# Patient Record
Sex: FEMALE | Race: WHITE | Employment: FULL TIME | URBAN - METROPOLITAN AREA
[De-identification: names, ages, dates, MRNs, and addresses within clinical notes are randomized per-mention and may not be internally consistent; named-entity substitution may affect disease eponyms.]

---

## 2024-03-13 ENCOUNTER — HOSPITAL ENCOUNTER (OUTPATIENT)
Age: 34
Setting detail: SPECIMEN
Discharge: HOME | End: 2024-03-13

## 2024-03-13 ENCOUNTER — ROUTINE PRENATAL (OUTPATIENT)
Dept: OBGYN CLINIC | Age: 34
End: 2024-03-13

## 2024-03-13 VITALS
BODY MASS INDEX: 38.65 KG/M2 | HEIGHT: 65 IN | WEIGHT: 232 LBS | DIASTOLIC BLOOD PRESSURE: 74 MMHG | SYSTOLIC BLOOD PRESSURE: 122 MMHG

## 2024-03-13 DIAGNOSIS — Z3A.11 11 WEEKS GESTATION OF PREGNANCY: ICD-10-CM

## 2024-03-13 DIAGNOSIS — O09.91 HRP (HIGH RISK PREGNANCY), FIRST TRIMESTER: Primary | ICD-10-CM

## 2024-03-13 DIAGNOSIS — O99.211 OBESITY AFFECTING PREGNANCY IN FIRST TRIMESTER, UNSPECIFIED OBESITY TYPE: ICD-10-CM

## 2024-03-13 DIAGNOSIS — Z3A.01 7 WEEKS GESTATION OF PREGNANCY: ICD-10-CM

## 2024-03-13 LAB
ABO + RH BLD: NORMAL
BASOPHILS # BLD: 0.04 K/UL (ref 0–0.2)
BASOPHILS NFR BLD: 0 % (ref 0–2)
BLOOD GROUP ANTIBODIES SERPL: NEGATIVE
EOSINOPHIL # BLD: 0.08 K/UL (ref 0–0.44)
EOSINOPHILS RELATIVE PERCENT: 1 % (ref 1–4)
ERYTHROCYTE [DISTWIDTH] IN BLOOD BY AUTOMATED COUNT: 12.7 % (ref 11.8–14.4)
HBV SURFACE AG SERPL QL IA: NONREACTIVE
HCT VFR BLD AUTO: 40.9 % (ref 36.3–47.1)
HCV AB SERPL QL IA: NONREACTIVE
HGB BLD-MCNC: 13.1 G/DL (ref 11.9–15.1)
HIV 1+2 AB+HIV1 P24 AG SERPL QL IA: NONREACTIVE
IMM GRANULOCYTES # BLD AUTO: <0.03 K/UL (ref 0–0.3)
IMM GRANULOCYTES NFR BLD: 0 %
LYMPHOCYTES NFR BLD: 2.38 K/UL (ref 1.1–3.7)
LYMPHOCYTES RELATIVE PERCENT: 25 % (ref 24–43)
MCH RBC QN AUTO: 28.5 PG (ref 25.2–33.5)
MCHC RBC AUTO-ENTMCNC: 32 G/DL (ref 28.4–34.8)
MCV RBC AUTO: 88.9 FL (ref 82.6–102.9)
MONOCYTES NFR BLD: 0.48 K/UL (ref 0.1–1.2)
MONOCYTES NFR BLD: 5 % (ref 3–12)
NEUTROPHILS NFR BLD: 69 % (ref 36–65)
NEUTS SEG NFR BLD: 6.7 K/UL (ref 1.5–8.1)
NRBC BLD-RTO: 0 PER 100 WBC
PLATELET # BLD AUTO: 374 K/UL (ref 138–453)
PMV BLD AUTO: 10 FL (ref 8.1–13.5)
RBC # BLD AUTO: 4.6 M/UL (ref 3.95–5.11)
RUBV IGG SERPL QL IA: 127 IU/ML
T PALLIDUM AB SER QL IA: NONREACTIVE
WBC OTHER # BLD: 9.7 K/UL (ref 3.5–11.3)

## 2024-03-13 PROCEDURE — 0502F SUBSEQUENT PRENATAL CARE: CPT | Performed by: ADVANCED PRACTICE MIDWIFE

## 2024-03-13 RX ORDER — ASPIRIN 81 MG/1
81 TABLET ORAL DAILY
Qty: 90 TABLET | Refills: 1 | Status: SHIPPED | OUTPATIENT
Start: 2024-03-13

## 2024-03-13 NOTE — PROGRESS NOTES
SUBJECTIVE:    Jennifer is here for her return OB visit. denies concerns today.   She denies  feeling fetal movement.  She denies vaginal bleeding.  She denies vaginal discharge.  She denies leaking of fluid.  She denies uterine cramping.  She denies  nausea and/or vomiting.    OBJECTIVE:  Blood pressure 122/74, height 1.651 m (5' 5\"), weight 105.2 kg (232 lb), last menstrual period 10/10/2023, not currently breastfeeding.    Total weight gain: 2.268 kg (5 lb)        ASSESSMENT/PLAN:  IUP @ 11+6 weeks  S=D    High Risk Pregnancy  Due date is based on LMP and confirmed with 7w6d early dating ultrasound  Patient's prenatal labs are not completed.  Patient's blood type is unknown   Early glucola indicated: yes  Completed: No  Patient Accepted  genetic screening.   Accepted, cell free DNA testing, and carrier screeningand test(s) are  completing after appointment today  Anatomy scan unscheduled   Total weight gain in pregnancy reviewed: Yes      2. 11 weeks gestation of pregnancy  - Reviewed warning signs   - Encouraged to scheduled anatomy scan   - Panorama Prenatal Test  - Horizon 14 (Pan-Ethnic Standard)    3. Obesity affecting pregnancy in first trimester, unspecified obesity type  - doing glucola today   - aspirin 81 MG EC tablet; Take 1 tablet by mouth daily  Dispense: 90 tablet; Refill: 1        She was counseled regarding all of the above:    Return in about 4 weeks (around 4/10/2024) for Return OB.    The patient, Lauren Franke was seen for 25 minutes were spent on this encounter on the date of service by the provider.         Electronically Signed By: SOL Miles CNM

## 2024-03-13 NOTE — PROGRESS NOTES
Chaperone for Intimate Exam  Chaperone was offered as part of the rooming process. Patient declined and agrees to continue with exam without a chaperone.  Chaperone: n/a    Patient unable to void at the time of vitals

## 2024-03-15 LAB — VZV IGG SER QL IA: 2.58

## 2024-03-20 LAB
Lab: NEGATIVE
Lab: NORMAL
NTRA ALPHA-THALASSEMIA: NEGATIVE
NTRA BETA-HEMOGLOBINOPATHIES: NEGATIVE
NTRA CANAVAN DISEASE: NEGATIVE
NTRA CYSTIC FIBROSIS: NEGATIVE
NTRA DUCHENNE/BECKER MUSCULAR DYSTROPHY: NEGATIVE
NTRA FAMILIAL DYSAUTONOMIA: NEGATIVE
NTRA FRAGILE X SYNDROME: NEGATIVE
NTRA GALACTOSEMIA: NEGATIVE
NTRA GAUCHER DISEASE: NEGATIVE
NTRA MEDIUM CHAIN ACYL-COA DEHYDROGENASE DEFICIENCY: NEGATIVE
NTRA POLYCYSTIC KIDNEY DISEASE, AUTOSOMAL RECESSIVE: NEGATIVE
NTRA SMITH-LEMLI-OPITZ SYNDROME: NEGATIVE
NTRA SPINAL MUSCULAR ATROPHY: NEGATIVE
NTRA TAY-SACHS DISEASE: NEGATIVE

## 2024-04-09 ENCOUNTER — HOSPITAL ENCOUNTER (OUTPATIENT)
Age: 34
Setting detail: SPECIMEN
Discharge: HOME | End: 2024-04-09

## 2024-04-09 DIAGNOSIS — O99.810 ABNORMAL GLUCOSE AFFECTING PREGNANCY: Primary | ICD-10-CM

## 2024-04-09 DIAGNOSIS — Z3A.01 7 WEEKS GESTATION OF PREGNANCY: ICD-10-CM

## 2024-04-09 LAB
GLUCOSE 1H P 50 G GLC PO SERPL-MCNC: 139 MG/DL (ref 70–135)
GLUCOSE ADMINISTRATION: ABNORMAL

## 2024-04-09 ASSESSMENT — ANXIETY QUESTIONNAIRES
7. FEELING AFRAID AS IF SOMETHING AWFUL MIGHT HAPPEN: NOT AT ALL
3. WORRYING TOO MUCH ABOUT DIFFERENT THINGS: NOT AT ALL
2. NOT BEING ABLE TO STOP OR CONTROL WORRYING: NOT AT ALL
4. TROUBLE RELAXING: NOT AT ALL
6. BECOMING EASILY ANNOYED OR IRRITABLE: NOT AT ALL
GAD7 TOTAL SCORE: 0
7. FEELING AFRAID AS IF SOMETHING AWFUL MIGHT HAPPEN: NOT AT ALL
IF YOU CHECKED OFF ANY PROBLEMS ON THIS QUESTIONNAIRE, HOW DIFFICULT HAVE THESE PROBLEMS MADE IT FOR YOU TO DO YOUR WORK, TAKE CARE OF THINGS AT HOME, OR GET ALONG WITH OTHER PEOPLE: NOT DIFFICULT AT ALL
4. TROUBLE RELAXING: NOT AT ALL
IF YOU CHECKED OFF ANY PROBLEMS ON THIS QUESTIONNAIRE, HOW DIFFICULT HAVE THESE PROBLEMS MADE IT FOR YOU TO DO YOUR WORK, TAKE CARE OF THINGS AT HOME, OR GET ALONG WITH OTHER PEOPLE: NOT DIFFICULT AT ALL
1. FEELING NERVOUS, ANXIOUS, OR ON EDGE: NOT AT ALL
5. BEING SO RESTLESS THAT IT IS HARD TO SIT STILL: NOT AT ALL
1. FEELING NERVOUS, ANXIOUS, OR ON EDGE: NOT AT ALL
3. WORRYING TOO MUCH ABOUT DIFFERENT THINGS: NOT AT ALL
6. BECOMING EASILY ANNOYED OR IRRITABLE: NOT AT ALL
5. BEING SO RESTLESS THAT IT IS HARD TO SIT STILL: NOT AT ALL
2. NOT BEING ABLE TO STOP OR CONTROL WORRYING: NOT AT ALL

## 2024-04-09 ASSESSMENT — EDINBURGH POSTNATAL DEPRESSION SCALE (EPDS)
THINGS HAVE BEEN GETTING ON TOP OF ME: NO, I HAVE BEEN COPING AS WELL AS EVER
I HAVE FELT SCARED OR PANICKY FOR NO GOOD REASON: NO, NOT AT ALL
I HAVE BLAMED MYSELF UNNECESSARILY WHEN THINGS WENT WRONG: NOT VERY OFTEN
I HAVE BEEN SO UNHAPPY THAT I HAVE HAD DIFFICULTY SLEEPING: NOT AT ALL
I HAVE BEEN ANXIOUS OR WORRIED FOR NO GOOD REASON: NO, NOT AT ALL
I HAVE LOOKED FORWARD WITH ENJOYMENT TO THINGS: AS MUCH AS I EVER DID
THE THOUGHT OF HARMING MYSELF HAS OCCURRED TO ME: NEVER
I HAVE BEEN ABLE TO LAUGH AND SEE THE FUNNY SIDE OF THINGS: AS MUCH AS I ALWAYS COULD
I HAVE BEEN SO UNHAPPY THAT I HAVE BEEN CRYING: NO, NEVER
I HAVE FELT SAD OR MISERABLE: NO, NOT AT ALL

## 2024-04-09 NOTE — RESULT ENCOUNTER NOTE
GDM results reviewed : failed early 1 hour screen 139, recommend fasting 3 hour. Order placed.    [employer] message sent to patient: Yes

## 2024-04-10 ENCOUNTER — ROUTINE PRENATAL (OUTPATIENT)
Dept: OBGYN CLINIC | Age: 34
End: 2024-04-10

## 2024-04-10 VITALS — BODY MASS INDEX: 38.61 KG/M2 | DIASTOLIC BLOOD PRESSURE: 62 MMHG | SYSTOLIC BLOOD PRESSURE: 118 MMHG | WEIGHT: 232 LBS

## 2024-04-10 DIAGNOSIS — O09.93 HIGH-RISK PREGNANCY IN THIRD TRIMESTER: ICD-10-CM

## 2024-04-10 DIAGNOSIS — O99.810 ABNORMAL GLUCOSE AFFECTING PREGNANCY: ICD-10-CM

## 2024-04-10 DIAGNOSIS — Z3A.15 15 WEEKS GESTATION OF PREGNANCY: Primary | ICD-10-CM

## 2024-04-10 PROCEDURE — 99024 POSTOP FOLLOW-UP VISIT: CPT | Performed by: STUDENT IN AN ORGANIZED HEALTH CARE EDUCATION/TRAINING PROGRAM

## 2024-04-10 RX ORDER — GLUCOSAMINE HCL/CHONDROITIN SU 500-400 MG
CAPSULE ORAL
Qty: 100 STRIP | Refills: 3 | Status: SHIPPED | OUTPATIENT
Start: 2024-04-10 | End: 2024-04-10

## 2024-04-10 RX ORDER — LANCETS 30 GAUGE
1 EACH MISCELLANEOUS 4 TIMES DAILY
Qty: 100 EACH | Refills: 3 | Status: SHIPPED | OUTPATIENT
Start: 2024-04-10 | End: 2024-04-10

## 2024-04-10 RX ORDER — UBIQUINOL 100 MG
CAPSULE ORAL
Qty: 100 EACH | Refills: 3 | Status: SHIPPED | OUTPATIENT
Start: 2024-04-10 | End: 2024-04-10

## 2024-04-10 RX ORDER — BLOOD-GLUCOSE METER
EACH MISCELLANEOUS
Qty: 1 EACH | Refills: 0 | Status: SHIPPED | OUTPATIENT
Start: 2024-04-10 | End: 2024-04-10

## 2024-04-10 NOTE — PROGRESS NOTES
**    Carrier Screening:  [] Undecided  [x] Accepted 3/13/2024 SOL Miles CNM   [] Declined  [] Known negative CF/SMA/Fragile X  [x] Results negative Horizon 14    Baby aspirin screen:  [x]Positive (parity and BMI)  []Negative    Early 1 hour GTT:  [x]Yes (BMI) 139 fail. 3 hour ordered 4/9/2024 SOL Miles CNM    [] No    AFP:  []Ordered  []Completed    Anatomy scan:  [x]Referral Sent 2/14/2024 Lara Herring LPN   [x]Scheduled   []Completed  [] Follow up **    Fetal Echo:   [] Yes  [] No    High Risk Factors:  Obesity   - pregravid BMI 38  - early 1 hour FAILED  - baby asa   - Patient declines 3 hr GTT due to feeling very sick after the 1 hour. Discussed checking blood sugars and she is amenable. Supplies sent 4/10/2024 Prema Ibrahim DO patient to provide values in 1-2 weeks    []Placed on High Risk List    Fetal Surveillance:  [x] Yes 37 weeks d/t BMI  [] No    Covid Vaccine:DECLINED 2/14/2024 Lara Herring LPN   Flu Vaccine:  [x]Given 10/23 2/14/2024 Lara Herring LPN   [] Declined   Tdap:  []Given **  [] Declined **  Rhogam:  []Given   [x] Not indicated     The problem list reflects the active issues addressed during today's visit    Patient Active Problem List    Diagnosis Date Noted    Abnormal glucose affecting pregnancy 04/09/2024    Migraine without aura and without status migrainosus, not intractable 10/31/2018    Eczema 01/08/2016     Return in about 4 weeks (around 5/8/2024) for routine ob.    Prema Ibrahim DO  [facility]  [address]    [address]  [place] [facility] [place], [address]  4/10/2024, 2:22 PM

## 2024-04-16 ENCOUNTER — HOSPITAL ENCOUNTER (OUTPATIENT)
Age: 34
Setting detail: SPECIMEN
Discharge: HOME | End: 2024-04-16

## 2024-04-16 DIAGNOSIS — O99.810 ABNORMAL GLUCOSE AFFECTING PREGNANCY: ICD-10-CM

## 2024-04-16 LAB
AMOUNT GLUCOSE GIVEN: NORMAL G
GLUCOSE 2H P 100 G GLC PO SERPL-MCNC: 59 MG/DL
GLUCOSE 3H P 100 G GLC PO SERPL-MCNC: 131 MG/DL
GLUCOSE TOLERANCE TEST 2 HOUR: 122 MG/DL
GLUCOSE TOLERANCE TEST 3 HOUR: 100 MG/DL

## 2024-04-16 NOTE — RESULT ENCOUNTER NOTE
GDM results reviewed : passed early 3 hour (59/131/122/100) 0/4 elevations, recommend repeating between 24-28 weeks    [employer] message sent to patient: Yes

## 2024-05-02 ENCOUNTER — PATIENT MESSAGE (OUTPATIENT)
Dept: OBGYN CLINIC | Age: 34
End: 2024-05-02

## 2024-05-02 NOTE — TELEPHONE ENCOUNTER
From: Lauren Franke  To: Hillary Quinn APRN - CNM  Sent: 5/2/2024 2:27 PM EDT  Subject: Zofran    Hi! My family has come down with a stomach bug. I don't have it currently but if I do end up with the very unpleasant symptoms, I am wondering if it is safe to take zofran? I have a prescription for it from my migraines. I just don't want to wait until if I start getting sick to ask

## 2024-05-07 ENCOUNTER — ROUTINE PRENATAL (OUTPATIENT)
Dept: OBGYN CLINIC | Age: 34
End: 2024-05-07

## 2024-05-07 VITALS — WEIGHT: 232.6 LBS | SYSTOLIC BLOOD PRESSURE: 120 MMHG | BODY MASS INDEX: 38.71 KG/M2 | DIASTOLIC BLOOD PRESSURE: 78 MMHG

## 2024-05-07 DIAGNOSIS — O99.810 ABNORMAL GLUCOSE AFFECTING PREGNANCY: ICD-10-CM

## 2024-05-07 DIAGNOSIS — O09.93 HIGH-RISK PREGNANCY IN THIRD TRIMESTER: Primary | ICD-10-CM

## 2024-05-07 DIAGNOSIS — Z3A.19 19 WEEKS GESTATION OF PREGNANCY: ICD-10-CM

## 2024-05-07 PROCEDURE — 0502F SUBSEQUENT PRENATAL CARE: CPT | Performed by: NURSE PRACTITIONER

## 2024-05-07 NOTE — PROGRESS NOTES
Presents for OB visit  Gestation 19w5d  Estimated Date of Delivery: 24    Insurance: [employer]eth[employer]    IPV bag/mug given:2024   Genetic Information given/reviewed: 2024 SOL Miles CNM   1st trimester education packet given: 2024 SOL Miles CNM   2nd trimester education packet given: 4/10/2024 Prema Ibrahim DO   3rd trimester education packet given:      FOB Name: Mark-    **Dating off USN**    Last Pap Smear:  NILM (no hx abnormal over 30 good for 5 years)  [x] Urine collected for culture 2024 Lara Herring LPN    [x] Negative date    [] Positive date   [x] UDS collected 2024 Maureen Barr MA  [x] Gc/ct collected 2024 Maureen Barr MA  [x] Prenatal Labs completed Prema Ibrahim DO     Genetic Screening:  [x]Accepted NIPS 3/13/2024 SOL Miles CNM   []Declined   [x]Completed  [x] Low risk female  [] Abn for     Carrier Screening:  [] Undecided  [x] Accepted 3/13/2024 SOL Miles CNM   [] Declined  [] Known negative CF/SMA/Fragile X  [x] Results negative Horizon 14    Baby aspirin screen:  [x]Positive (parity and BMI)  []Negative    Early 1 hour GTT:  [x]Yes (BMI) 139 fail. 3 hour ordered 2024 SOL Miles CNM    [] No    AFP:  []Ordered  []Completed    Anatomy scan:  [x]Referral Sent 2024 Lara Herring LPN   [x]Scheduled   []Completed  [] Follow up **    Fetal Echo:   [] Yes  [] No    High Risk Factors:  Obesity   - pregravid BMI 38  - early 1 hour FAILED  - baby asa   - Patient declines 3 hr GTT due to feeling very sick after the 1 hour. Discussed checking blood sugars and she is amenable. Supplies sent 4/10/2024 Prema Ibrahim DO patient to provide values in 1-2 weeks    []Placed on High Risk List    Fetal Surveillance:  [x] Yes 37 weeks d/t BMI  [] No    Covid Vaccine:DECLINED 2024 Lara Herring LPN   Flu Vaccine:  [x]Given 10/23 2024 Lara Herring LPN

## 2024-05-13 ENCOUNTER — ROUTINE PRENATAL (OUTPATIENT)
Dept: PERINATAL CARE | Age: 34
End: 2024-05-13
Payer: COMMERCIAL

## 2024-05-13 VITALS
DIASTOLIC BLOOD PRESSURE: 59 MMHG | SYSTOLIC BLOOD PRESSURE: 106 MMHG | HEART RATE: 79 BPM | WEIGHT: 234.57 LBS | TEMPERATURE: 98.1 F | HEIGHT: 66 IN | RESPIRATION RATE: 16 BRPM | BODY MASS INDEX: 37.7 KG/M2

## 2024-05-13 DIAGNOSIS — Z36.86 ENCOUNTER FOR SCREENING FOR RISK OF PRE-TERM LABOR: ICD-10-CM

## 2024-05-13 DIAGNOSIS — O99.212 OBESITY AFFECTING PREGNANCY IN SECOND TRIMESTER, UNSPECIFIED OBESITY TYPE: ICD-10-CM

## 2024-05-13 DIAGNOSIS — O09.512 PRIMIGRAVIDA OF ADVANCED MATERNAL AGE IN SECOND TRIMESTER: Primary | ICD-10-CM

## 2024-05-13 DIAGNOSIS — Z3A.20 20 WEEKS GESTATION OF PREGNANCY: ICD-10-CM

## 2024-05-13 DIAGNOSIS — O99.810 ABNORMAL MATERNAL GLUCOSE TOLERANCE, ANTEPARTUM: ICD-10-CM

## 2024-05-13 DIAGNOSIS — O44.22 PLACENTA MARGINALIS IN SECOND TRIMESTER: ICD-10-CM

## 2024-05-13 PROCEDURE — 76811 OB US DETAILED SNGL FETUS: CPT | Performed by: OBSTETRICS & GYNECOLOGY

## 2024-05-13 PROCEDURE — 76817 TRANSVAGINAL US OBSTETRIC: CPT | Performed by: OBSTETRICS & GYNECOLOGY

## 2024-05-13 PROCEDURE — 99999 PR OFFICE/OUTPT VISIT,PROCEDURE ONLY: CPT | Performed by: OBSTETRICS & GYNECOLOGY

## 2024-05-13 NOTE — PROGRESS NOTES
I would advise continuation of daily oral baby aspirin 81 mg based on guidelines by the USPSTF/ACOG (for preeclampsia prevention for pregnant women at \"high-risk\"  for preeclampsia).      Advise repeat 3-hour glucose tolerance test between 24-28 weeks gestation to evaluate for gestational diabetes.       Please refer to non-invasive prenatal testing/NIPT results (via [facility]).     Please refer to maternal carrier results from [place] carrier screening testing.     Options with respect to offering the patient invasive genetic prenatal testing and MSAFP screen could not be completed today, as the patient declines a Maternal-Fetal Medicine physician consultation today.     Patient declines a Maternal-Fetal Medicine complete physician consultation today regarding the fetal and/or maternal medical/obstetrical complications/co-morbidities of pregnancy.      Maternal-Fetal Medicine (MFM) attending physician will defer all management for these medical/obstetrical complications of pregnancy to the primary attending obstetrical physician/provider, as a result.  Therefore, only an ultrasound evaluation was completed today in the MFM office.      Please refer to Maternal-Fetal Medicine OBGYN resident progress note in [facility].

## 2024-05-13 NOTE — PROGRESS NOTES
Obstetric/Gynecology Maternal Fetal Medicine Resident Note      Patient declines formal consult with MFM attending physician for marginal cord insertion seen on ultrasound today as well as AMA at the time of delivery.     Rhonda Hills DO  OBGYSEGUNDO Resident, PGY1  [facility]  5/13/2024, 2:20 PM

## 2024-06-05 ENCOUNTER — ROUTINE PRENATAL (OUTPATIENT)
Dept: OBGYN CLINIC | Age: 34
End: 2024-06-05

## 2024-06-05 VITALS
BODY MASS INDEX: 38.09 KG/M2 | WEIGHT: 237 LBS | SYSTOLIC BLOOD PRESSURE: 102 MMHG | HEIGHT: 66 IN | DIASTOLIC BLOOD PRESSURE: 70 MMHG

## 2024-06-05 DIAGNOSIS — O43.199 MARGINAL INSERTION OF UMBILICAL CORD AFFECTING MANAGEMENT OF MOTHER: ICD-10-CM

## 2024-06-05 DIAGNOSIS — O09.92 HRP (HIGH RISK PREGNANCY), SECOND TRIMESTER: Primary | ICD-10-CM

## 2024-06-05 DIAGNOSIS — O26.899 PELVIC PAIN IN PREGNANCY: ICD-10-CM

## 2024-06-05 DIAGNOSIS — O99.810 ABNORMAL GLUCOSE AFFECTING PREGNANCY: ICD-10-CM

## 2024-06-05 DIAGNOSIS — R10.2 PELVIC PAIN IN PREGNANCY: ICD-10-CM

## 2024-06-05 DIAGNOSIS — Z3A.23 23 WEEKS GESTATION OF PREGNANCY: ICD-10-CM

## 2024-06-05 DIAGNOSIS — O99.212 OBESITY AFFECTING PREGNANCY IN SECOND TRIMESTER, UNSPECIFIED OBESITY TYPE: ICD-10-CM

## 2024-06-05 PROCEDURE — 0502F SUBSEQUENT PRENATAL CARE: CPT | Performed by: ADVANCED PRACTICE MIDWIFE

## 2024-06-05 NOTE — PROGRESS NOTES
SUBJECTIVE:    Jennifer is here for her return OB visit. admits to concerns today. On going pelvic pain  She reports  feeling fetal movement.  She denies vaginal bleeding.  She denies vaginal discharge.  She denies leaking of fluid.  She denies uterine cramping.  She denies  nausea and/or vomiting.    OBJECTIVE:  Blood pressure 102/70, height 1.676 m (5' 6\"), weight 107.5 kg (237 lb), last menstrual period 10/10/2023, not currently breastfeeding.    Total weight gain: 4.536 kg (10 lb)      ASSESSMENT/PLAN:  IUP @ 23+6 weeks  S=D    High Risk Pregnancy  Due date is based on LMP and confirmed with 7w6d early dating ultrasound  Patient's prenatal labs are completed.  Patient's blood type A positive and rhogam is not indicated in the pregnancy.   Early glucola indicated: yes  Completed: Yes  Results: 139 Fail  Plan: Fasting 3 hour completed 24 59/131/122/100 0/4 no elevations  Patient Accepted  genetic screening.   NIPT could not be completed  Repeat NIPT low risk  Negative carrier screening  Anatomy scan scheduled 24 completed. Placenta anterior,normal cord insertion: No and MARGINAL CORD INSERTION cervical length 4.38 cm, NO low lying placenta, no placenta previa .   F/up USN 6/10/24  Second trimester 24-28 week labs:   [x] Ordered 2024 SOL Miles - ADEOLA   Total weight gain in pregnancy reviewed: Yes  Fetal movement was reviewed.  Reviewed signs and symptoms of  labor: Yes  Reviewed signs and symptoms of preeclampsia: Yes  Reviewed signs and symptoms of alessio hick contractions:  Yes      2. 23 weeks gestation of pregnancy  - Reviewed third trimester labs. Agreeable to repeating fasting 3 hour   - Reviewed tdap recommendations   - CBC; Future  - Culture, Urine; Future    3. Marginal insertion of umbilical cord affecting management of mother  - Reviewed findings on MFM    4. Abnormal glucose affecting pregnancy  - Glucose Tolerance, 3 Hours; Future    5. Obesity affecting pregnancy

## 2024-06-10 ENCOUNTER — ROUTINE PRENATAL (OUTPATIENT)
Dept: PERINATAL CARE | Age: 34
End: 2024-06-10
Payer: COMMERCIAL

## 2024-06-10 VITALS
DIASTOLIC BLOOD PRESSURE: 71 MMHG | HEART RATE: 81 BPM | HEIGHT: 66 IN | TEMPERATURE: 97.3 F | RESPIRATION RATE: 16 BRPM | WEIGHT: 242.29 LBS | BODY MASS INDEX: 38.94 KG/M2 | SYSTOLIC BLOOD PRESSURE: 116 MMHG

## 2024-06-10 DIAGNOSIS — Z3A.24 24 WEEKS GESTATION OF PREGNANCY: ICD-10-CM

## 2024-06-10 DIAGNOSIS — O09.512 PRIMIGRAVIDA OF ADVANCED MATERNAL AGE IN SECOND TRIMESTER: ICD-10-CM

## 2024-06-10 DIAGNOSIS — O99.810 ABNORMAL MATERNAL GLUCOSE TOLERANCE, ANTEPARTUM: ICD-10-CM

## 2024-06-10 DIAGNOSIS — O99.212 OBESITY AFFECTING PREGNANCY IN SECOND TRIMESTER, UNSPECIFIED OBESITY TYPE: ICD-10-CM

## 2024-06-10 DIAGNOSIS — O44.22 PLACENTA MARGINALIS IN SECOND TRIMESTER: Primary | ICD-10-CM

## 2024-06-10 DIAGNOSIS — Z36.4 ULTRASOUND FOR ANTENATAL SCREENING FOR FETAL GROWTH RESTRICTION: ICD-10-CM

## 2024-06-10 PROCEDURE — 76816 OB US FOLLOW-UP PER FETUS: CPT | Performed by: OBSTETRICS & GYNECOLOGY

## 2024-06-10 PROCEDURE — 99999 PR OFFICE/OUTPT VISIT,PROCEDURE ONLY: CPT | Performed by: OBSTETRICS & GYNECOLOGY

## 2024-06-10 PROCEDURE — 76820 UMBILICAL ARTERY ECHO: CPT | Performed by: OBSTETRICS & GYNECOLOGY

## 2024-06-12 ENCOUNTER — HOSPITAL ENCOUNTER (OUTPATIENT)
Dept: PHYSICAL THERAPY | Facility: CLINIC | Age: 34
Setting detail: THERAPIES SERIES
Discharge: HOME | End: 2024-06-12
Payer: COMMERCIAL

## 2024-06-12 PROCEDURE — 97140 MANUAL THERAPY 1/> REGIONS: CPT

## 2024-06-12 PROCEDURE — 97530 THERAPEUTIC ACTIVITIES: CPT

## 2024-06-12 PROCEDURE — 97161 PT EVAL LOW COMPLEX 20 MIN: CPT

## 2024-06-12 NOTE — CONSULTS
mobility & transfers to reduce pressure  - Urinary Incontinence  - Ice  - Get To Know Your Pelvic Floor- Female  - Sacroiliac Joint Dysfunction      Evaluation Complexity:  History (Personal factors, comorbidities) [] 0 [] 1-2 [x] 3+   Exam (limitations, restrictions) [] 1-2 [] 3 [x] 4+   Clinical presentation (progression) [] Stable [x] Evolving  [] Unstable   Decision Making [x] Low [] Moderate [] High     [x] Low Complexity [] Moderate Complexity [] High Complexity      Treatment Charges: Mins Units   [x] Evaluation (low complex) 30 1   [] Modalities         [x] Ther Exercise 8 0   [x] Manual Therapy 10  1    [x] Ther Activities 10 1   [] Aquatics         [] Vasocompression         [] Other             TOTAL TREATMENT TIME: 58 min    Time in: 1p Time out: 2p      Electronically signed by: Josefina Stallworth PT, DPT     Physician Signature:________________________________Date:__________________  By signing above or cosigning this note, I have reviewed this plan of care and certify a need for medically necessary rehabilitation services.       *PLEASE SIGN ABOVE AND FAX BACK ALL PAGES*

## 2024-06-16 ENCOUNTER — PATIENT MESSAGE (OUTPATIENT)
Dept: OBGYN CLINIC | Age: 34
End: 2024-06-16

## 2024-06-17 NOTE — TELEPHONE ENCOUNTER
From: Lauren Franke  To: Hillary Quinn APRN - CNM  Sent: 6/16/2024 1:01 PM EDT  Subject: Foot/ankle swelling    I know that it's normal as pregnancy progresses but for the past 3 days I've been having swelling in my feet and ankles. I wasn't sure if it's too early for that or if it could be something I need to be worried about?

## 2024-06-18 ENCOUNTER — HOSPITAL ENCOUNTER (OUTPATIENT)
Dept: PHYSICAL THERAPY | Facility: CLINIC | Age: 34
Setting detail: THERAPIES SERIES
Discharge: HOME | End: 2024-06-18
Payer: COMMERCIAL

## 2024-06-18 PROCEDURE — 97140 MANUAL THERAPY 1/> REGIONS: CPT

## 2024-06-18 PROCEDURE — 97110 THERAPEUTIC EXERCISES: CPT

## 2024-06-18 NOTE — FLOWSHEET NOTE
[x] [facility]  Outpatient Rehabilitation &  Therapy  [address]  P: [phone]  F: [phone]     Physical Therapy Daily Treatment Note    Date:  2024  Patient Name:  Lauren Franke    :  1990  MRN: [id number]  Physician: Hillary Quinn                           Insurance: [facility] only 4 modalities per visit   Medical Diagnosis: O26.899, R10.2 (ICD-10-CM) - Pelvic pain in pregnancy    Rehab Codes: M54.9, M79.1, R26.2NEC, R29.3, N39.46, M62.81, M99.05, M62.08   Onset Date: 24                         Next 's appt.: 7/3/24  Visit# / total visits:      Cancels/No Shows: 0    Subjective:    Pain:  [x] Yes  [] No  Location: L sided SI joint  Pain Rating: (0-10 scale) 2-3/10  Pain altered Tx:  [x] No  [] Yes  Action:    Comments: Pt with increased LE swelling over the weekend, has since subsided. Pt has been monitoring her BP, which has remained normal. Pt with mild SI joint pain on the L. R sided pain resolved after last session.     Objective:  Modalities:   Kinesiotape: 4 long I's: 2 // to linea alba and 2 horizontal placed from ASIS to ASIS- reapplied   Precautions: Pregnant - Due 24; MFM for monitoring marginal cord & anterior placenta placement   Exercises: HEP Review - [employer] access code: [email or web address] - pt own stability ball, yoga block, play-ball, TRX for exercise at home  KT= Kinesiotape  PB= pelvic brace (DB-exhale+ TA contraction + kegel)  DB= diaphragmatic breathing  Exercise Reps/ Time Weight/ Level Comments   Nustep            Pelvic model & education    Analogies - IAP & use of pelvic brace to reduce pressure/coning  Diaphragm & pelvic floor relationship (visual aid) in relation to pressure management & function     Squatty potty for all voiding  log rolling    keep ribs over pelvis   lumbar roll - use towel  carseat adjustment      Serola Pelvic Belt     exhale with effort & movement     kinesiotape - waterproof 4-7days  -

## 2024-06-25 ENCOUNTER — HOSPITAL ENCOUNTER (OUTPATIENT)
Dept: PHYSICAL THERAPY | Facility: CLINIC | Age: 34
Setting detail: THERAPIES SERIES
Discharge: HOME | End: 2024-06-25
Payer: COMMERCIAL

## 2024-06-25 PROCEDURE — 97110 THERAPEUTIC EXERCISES: CPT

## 2024-06-25 PROCEDURE — 97140 MANUAL THERAPY 1/> REGIONS: CPT

## 2024-06-25 NOTE — FLOWSHEET NOTE
[x] [facility]  Outpatient Rehabilitation &  Therapy  [address]  P: [phone]  F: [phone]     Physical Therapy Daily Treatment Note    Date:  2024  Patient Name:  Lauren Franke    :  1990  MRN: [id number]  Physician: Hillary Quinn                           Insurance: [facility] -60 only 4 modalities per visit   Medical Diagnosis: O26.899, R10.2 (ICD-10-CM) - Pelvic pain in pregnancy    Rehab Codes: M54.9, M79.1, R26.2NEC, R29.3, N39.46, M62.81, M99.05, M62.08   Onset Date: 24                         Next 's appt.: 7/3/24  Visit# / total visits: 3/20     Cancels/No Shows: 0    Subjective:    Pain:  [x] Yes  [] No  Location: L sided SI joint  Pain Rating: (0-10 scale) 4/10  Pain altered Tx:  [x] No  [] Yes  Action:    Comments: Pt c/o increased L piriformis pain for the past 2 days. Has been using her massage gun at home for relief.     Objective:  Modalities:   Kinesiotape: 4 long I's: 2 // to linea alba and 2 horizontal placed from ASIS to ASIS- did not reapply  d/t skin irritation   Precautions: Pregnant - Due 24; MFM for monitoring marginal cord & anterior placenta placement   Exercises: HEP Review - [employer] access code: [email or web address] - pt own stability ball, yoga block, play-ball, TRX for exercise at home  KT= Kinesiotape  PB= pelvic brace (DB-exhale+ TA contraction + kegel)  DB= diaphragmatic breathing  Exercise Reps/ Time Weight/ Level Comments   Nustep            Pelvic model & education    Analogies - IAP & use of pelvic brace to reduce pressure/coning  Diaphragm & pelvic floor relationship (visual aid) in relation to pressure management & function     Squatty potty for all voiding  log rolling    keep ribs over pelvis   lumbar roll - use towel  carseat adjustment      Serola Pelvic Belt     exhale with effort & movement     kinesiotape - waterproof 4-7days  - pressure/ pull to remove - ensure not too itchy or sweaty

## 2024-07-03 ENCOUNTER — ROUTINE PRENATAL (OUTPATIENT)
Dept: OBGYN CLINIC | Age: 34
End: 2024-07-03

## 2024-07-03 VITALS — DIASTOLIC BLOOD PRESSURE: 64 MMHG | WEIGHT: 243 LBS | SYSTOLIC BLOOD PRESSURE: 116 MMHG | BODY MASS INDEX: 39.22 KG/M2

## 2024-07-03 DIAGNOSIS — Z3A.27 27 WEEKS GESTATION OF PREGNANCY: Primary | ICD-10-CM

## 2024-07-03 DIAGNOSIS — O09.92 HRP (HIGH RISK PREGNANCY), SECOND TRIMESTER: ICD-10-CM

## 2024-07-03 PROCEDURE — 0502F SUBSEQUENT PRENATAL CARE: CPT | Performed by: OBSTETRICS & GYNECOLOGY

## 2024-07-04 NOTE — PROGRESS NOTES
Lauren Franke is a 34 y.o. female 28w0d        OB History    Para Term  AB Living   1 0 0 0 0 0   SAB IAB Ectopic Molar Multiple Live Births   0 0 0 0 0 0      # Outcome Date GA Lbr Eusebio/2nd Weight Sex Delivery Anes PTL Lv   1 Current                Vitals  BP: 116/64  Weight - Scale: 110.2 kg (243 lb)  Fundal Height (cm): 27 cm  Fetal HR: 154  Movement: Present      The patient was seen and evaluated. There was positive fetal movements. No contractions or leakage of fluid. Signs and symptoms of  labor as well as labor were reviewed. The S/S of Pre-Eclampsia were reviewed with the patient in detail. She is to report any of these if they occur. She currently denies any of these.    The patient had her 28 week labs completed.  No visits with results within 5 Week(s) from this visit.   Latest known visit with results is:   Hospital Outpatient Visit on 2024   Component Date Value Ref Range Status    Amount Glucose Given 2024 100G  g Final    Glucose, Fasting 2024 59  mg/dL Final    Glucose, GTT - 1 Hour 2024 131  mg/dL Final    Glucose, GTT - 2 Hour 2024 122  mg/dL Final    Glucose, GTT - 3 Hour 2024 100  mg/dL Final   ]    Insurance: [employer]eth[employer]    IPV bag/mug given:2024   Genetic Information given/reviewed: 2024 SOL Miles CNM   1st trimester education packet given: 2024 SOL Miles CNM   2nd trimester education packet given: 4/10/2024 Prema Ibrahim DO   3rd trimester education packet given:      FOB Name: Mark-    **Dating off USN**    Last Pap Smear:  NILM (no hx abnormal over 30 good for 5 years)  [x] Urine collected for culture 2024 Lara Herring LPN    [x] Negative date    [] Positive date   [x] UDS collected 2024 Maureen Barr MA  [x] Gc/ct collected 2024 Maureen Barr MA  [x] Prenatal Labs completed Prema K Cacciotti, DO     Genetic Screening:  [x]Accepted

## 2024-07-09 ENCOUNTER — HOSPITAL ENCOUNTER (OUTPATIENT)
Dept: PHYSICAL THERAPY | Facility: CLINIC | Age: 34
Setting detail: THERAPIES SERIES
Discharge: HOME | End: 2024-07-09
Payer: COMMERCIAL

## 2024-07-09 PROCEDURE — 97110 THERAPEUTIC EXERCISES: CPT

## 2024-07-09 PROCEDURE — 97140 MANUAL THERAPY 1/> REGIONS: CPT

## 2024-07-09 NOTE — FLOWSHEET NOTE
services in order to: reduce pain; stabilize pelvic girdle & somatic dysfunctions; increase core/PF strength (if applicable); improve B hip flexibility/PFM/diaphragm excursion; postural awareness/body mechanics/intraabdominal pressure regulation in preparation for vaginal delivery.      Problems indicated at evaluation 6/12/24:    [x] ? Pain: B SIJ pain (R>L)  [x] ? ROM: B hip inflexibility: R all planes, L adductors & L piriformis; B rib cage expansion with moderate fascial restrictions   [x] ? Strength: core; PF (likely reduced PF excursion)   [x] ? Function: Urogenital Distress Inventory (KAMILA): 3 points, 12.5% deficit  [x] Other: nocturia 1-2x, RAMESH + coning with breath holding & increased IAP with transfers, constipation, UUI, CARMEN, impaired loading transfer & IAP management with SL loading     STG: (to be met in 10 treatments)  ? Pain: To <3/10 on average to ease ADLs  ? ROM: Pt will demonstrate improved B hip flexibility all planes to WNLs to ease ADLs  Pt will demo independence with Home Exercise Programs, urinary urge suppression/constipation strategies to indicate improved pelvic health function   Pt to demo ability to hold B ASLR & B SLS without significant compensations & proper pelvic bracing for optimal load transfer   Pt will be compliant with pelvic belt use for pelvic girdle stabilization with advanced ADLs, to reduce load management & pain control  Pt will demo use of functional PFM contraction by performing a “pelvic brace” or “knack” for intraabdominal pressure management for reduction of CARMEN with laugh/cough/sneeze  ? Strength: Pt will demo PF endurance 5 seconds or greater for improved pelvic health function & reduce UI  Pt will demo proper toileting posture & use of BLE elevation (with a stool or squatty potty) in order to aid in reduction of constipation & complete bowel emptying  Pt will report nocturia reduction to 0-1x per night to indicate improved bladder function to allow for restful

## 2024-07-11 ENCOUNTER — ROUTINE PRENATAL (OUTPATIENT)
Dept: PERINATAL CARE | Age: 34
End: 2024-07-11
Payer: COMMERCIAL

## 2024-07-11 VITALS
BODY MASS INDEX: 39.19 KG/M2 | TEMPERATURE: 96.6 F | DIASTOLIC BLOOD PRESSURE: 67 MMHG | WEIGHT: 243.83 LBS | RESPIRATION RATE: 16 BRPM | HEIGHT: 66 IN | HEART RATE: 79 BPM | SYSTOLIC BLOOD PRESSURE: 117 MMHG

## 2024-07-11 DIAGNOSIS — O09.513 PRIMIGRAVIDA OF ADVANCED MATERNAL AGE IN THIRD TRIMESTER: ICD-10-CM

## 2024-07-11 DIAGNOSIS — O99.810 ABNORMAL MATERNAL GLUCOSE TOLERANCE, ANTEPARTUM: ICD-10-CM

## 2024-07-11 DIAGNOSIS — Z3A.29 29 WEEKS GESTATION OF PREGNANCY: ICD-10-CM

## 2024-07-11 DIAGNOSIS — O44.23 PLACENTA MARGINALIS IN THIRD TRIMESTER: Primary | ICD-10-CM

## 2024-07-11 DIAGNOSIS — Z36.4 ULTRASOUND FOR ANTENATAL SCREENING FOR FETAL GROWTH RESTRICTION: ICD-10-CM

## 2024-07-11 DIAGNOSIS — Z36.3 ENCOUNTER FOR ROUTINE SCREENING FOR MALFORMATION USING ULTRASONICS: ICD-10-CM

## 2024-07-11 DIAGNOSIS — O99.213 OBESITY AFFECTING PREGNANCY IN THIRD TRIMESTER, UNSPECIFIED OBESITY TYPE: ICD-10-CM

## 2024-07-11 PROCEDURE — 76819 FETAL BIOPHYS PROFIL W/O NST: CPT | Performed by: OBSTETRICS & GYNECOLOGY

## 2024-07-11 PROCEDURE — 76805 OB US >/= 14 WKS SNGL FETUS: CPT | Performed by: OBSTETRICS & GYNECOLOGY

## 2024-07-11 PROCEDURE — 76820 UMBILICAL ARTERY ECHO: CPT | Performed by: OBSTETRICS & GYNECOLOGY

## 2024-07-11 PROCEDURE — 76817 TRANSVAGINAL US OBSTETRIC: CPT | Performed by: OBSTETRICS & GYNECOLOGY

## 2024-07-11 PROCEDURE — 99999 PR OFFICE/OUTPT VISIT,PROCEDURE ONLY: CPT | Performed by: OBSTETRICS & GYNECOLOGY

## 2024-07-16 ENCOUNTER — HOSPITAL ENCOUNTER (OUTPATIENT)
Dept: PHYSICAL THERAPY | Facility: CLINIC | Age: 34
Setting detail: THERAPIES SERIES
Discharge: HOME | End: 2024-07-16
Payer: COMMERCIAL

## 2024-07-16 PROCEDURE — 97140 MANUAL THERAPY 1/> REGIONS: CPT

## 2024-07-16 PROCEDURE — 97110 THERAPEUTIC EXERCISES: CPT

## 2024-07-16 NOTE — FLOWSHEET NOTE
emptying  Pt will report nocturia reduction to 0-1x per night to indicate improved bladder function to allow for restful sleep.      LTG: (to be met in 20 treatments)  ? Pain: To <1-2/10 to ease ADLs  Pt will adhere to tissue health & self massage regimen for birth preparation once in 34 weeks gestation  Pt to be able to contract TA with relaxation of pelvic floor as measured by dual channel sEMG &/or external palpation to improve pushing mechanics during vaginal birth if applicable.  ? Strength: Pt will demo PF endurance 8 seconds or greater for improved pelvic health function & reduce UI  Pt will report 1 points or less on KAMILA to indicate improved LBP & pelvic health function      Patient goals: \"alleviate sxs & loosen/lengthen PF to give the best chance for a a natural birth\"    Pt. Education:  [x] Yes  [] No  [x] Reviewed Prior HEP/Ed  Method of Education: [x] Verbal- [] Demo  [x] Written-added bolded below  Access Code: [email or web address]  URL: [email or web address]  Date: 07/16/2024  Prepared by: Catalina Vargas  Program Notes  Squatty potty for all voidinglog rolling  keep ribs over pelvis lumbar roll - use towelcarseat adjustment Serola Pelvic Beltexhale with effort & movement kinesiotape - waterproof 4-7days  - pressure/ pull to remove - ensure not too itchy or sweaty  Exercises  - Pelvic Muscle Energy Technique With Adduction and Abduction  - 1 x daily - 7 x weekly - 1 sets - 10 reps  - Seated Transversus Abdominis Bracing  - 1 x daily - 7 x weekly - 1 sets - 10 reps - 5 second hold  - Seated Hamstring Stretch  - 1 x daily - 3-5 x weekly - 1 sets - 1 reps - 60 hold  - Seated Piriformis Stretch  - 1 x daily - 3-5 x weekly - 1 sets - 1 reps - 60 hold  - Seated Hip Adductor Stretch  - 1 x daily - 3-5 x weekly - 1 sets - 1 reps - 60 hold  - Seated Swiss Ball Pelvic Circles  - 1 x daily - 7 x weekly - 1 sets - 10 reps  - Cat-Camel  - 1 x daily - 7 x weekly - 1 sets - 10 reps  - Seated Transversus Abdominis Bracing

## 2024-07-24 ENCOUNTER — APPOINTMENT (OUTPATIENT)
Dept: PHYSICAL THERAPY | Facility: CLINIC | Age: 34
End: 2024-07-24
Payer: COMMERCIAL

## 2024-07-30 ENCOUNTER — HOSPITAL ENCOUNTER (OUTPATIENT)
Dept: PHYSICAL THERAPY | Facility: CLINIC | Age: 34
Setting detail: THERAPIES SERIES
Discharge: HOME | End: 2024-07-30
Payer: COMMERCIAL

## 2024-07-30 PROCEDURE — 97110 THERAPEUTIC EXERCISES: CPT

## 2024-07-30 NOTE — FLOWSHEET NOTE
health & self massage regimen for birth preparation once in 34 weeks gestation  Pt to be able to contract TA with relaxation of pelvic floor as measured by dual channel sEMG &/or external palpation to improve pushing mechanics during vaginal birth if applicable.  ? Strength: Pt will demo PF endurance 8 seconds or greater for improved pelvic health function & reduce UI  Pt will report 1 points or less on KAMILA to indicate improved LBP & pelvic health function      Patient goals: \"alleviate sxs & loosen/lengthen PF to give the best chance for a a natural birth\"    Pt. Education:  [x] Yes  [] No  [x] Reviewed Prior HEP/Ed  Method of Education: [x] Verbal- [x] Demo  [] Written-added bolded below  Access Code: [email or web address]  URL: [email or web address]  Date: 07/16/2024  Prepared by: Catalina Vargas  Program Notes  Squatty potty for all voidinglog rolling  keep ribs over pelvis lumbar roll - use towelcarseat adjustment Serola Pelvic Beltexhale with effort & movement kinesiotape - waterproof 4-7days  - pressure/ pull to remove - ensure not too itchy or sweaty  Exercises  - Pelvic Muscle Energy Technique With Adduction and Abduction  - 1 x daily - 7 x weekly - 1 sets - 10 reps  - Seated Transversus Abdominis Bracing  - 1 x daily - 7 x weekly - 1 sets - 10 reps - 5 second hold  - Seated Hamstring Stretch  - 1 x daily - 3-5 x weekly - 1 sets - 1 reps - 60 hold  - Seated Piriformis Stretch  - 1 x daily - 3-5 x weekly - 1 sets - 1 reps - 60 hold  - Seated Hip Adductor Stretch  - 1 x daily - 3-5 x weekly - 1 sets - 1 reps - 60 hold  - Seated Swiss Ball Pelvic Circles  - 1 x daily - 7 x weekly - 1 sets - 10 reps  - Cat-Camel  - 1 x daily - 7 x weekly - 1 sets - 10 reps  - Seated Transversus Abdominis Bracing  - 1 x daily - 7 x weekly - 10 reps - 5 seconds hold  - Seated Hip Adduction Squeeze with Ball  - 1 x daily - 7 x weekly - 10 reps - 5 seconds hold  - Seated Hip Abduction with Resistance  - 1 x daily - 7 x weekly - 10

## 2024-08-01 ENCOUNTER — ROUTINE PRENATAL (OUTPATIENT)
Dept: OBGYN CLINIC | Age: 34
End: 2024-08-01
Payer: COMMERCIAL

## 2024-08-01 VITALS — BODY MASS INDEX: 40.03 KG/M2 | WEIGHT: 248 LBS | DIASTOLIC BLOOD PRESSURE: 78 MMHG | SYSTOLIC BLOOD PRESSURE: 120 MMHG

## 2024-08-01 DIAGNOSIS — O09.93 HIGH-RISK PREGNANCY IN THIRD TRIMESTER: Primary | ICD-10-CM

## 2024-08-01 DIAGNOSIS — O09.523 MULTIGRAVIDA OF ADVANCED MATERNAL AGE IN THIRD TRIMESTER: ICD-10-CM

## 2024-08-01 DIAGNOSIS — O43.199 MARGINAL INSERTION OF UMBILICAL CORD AFFECTING MANAGEMENT OF MOTHER: ICD-10-CM

## 2024-08-01 DIAGNOSIS — Z3A.32 32 WEEKS GESTATION OF PREGNANCY: ICD-10-CM

## 2024-08-01 DIAGNOSIS — Z23 NEED FOR TDAP VACCINATION: ICD-10-CM

## 2024-08-01 DIAGNOSIS — O99.212 OBESITY AFFECTING PREGNANCY IN SECOND TRIMESTER, UNSPECIFIED OBESITY TYPE: ICD-10-CM

## 2024-08-01 PROCEDURE — 90715 TDAP VACCINE 7 YRS/> IM: CPT | Performed by: NURSE PRACTITIONER

## 2024-08-01 PROCEDURE — 90471 IMMUNIZATION ADMIN: CPT | Performed by: NURSE PRACTITIONER

## 2024-08-01 PROCEDURE — 0502F SUBSEQUENT PRENATAL CARE: CPT | Performed by: NURSE PRACTITIONER

## 2024-08-01 NOTE — PATIENT INSTRUCTIONS
always ask your healthcare professional. [employer] disclaims any warranty or liability for your use of this information.

## 2024-08-05 ENCOUNTER — HOSPITAL ENCOUNTER (OUTPATIENT)
Age: 34
Setting detail: SPECIMEN
Discharge: HOME | End: 2024-08-05

## 2024-08-05 DIAGNOSIS — O99.810 ABNORMAL GLUCOSE AFFECTING PREGNANCY: ICD-10-CM

## 2024-08-05 DIAGNOSIS — Z3A.23 23 WEEKS GESTATION OF PREGNANCY: ICD-10-CM

## 2024-08-05 LAB
AMOUNT GLUCOSE GIVEN: NORMAL G
ERYTHROCYTE [DISTWIDTH] IN BLOOD BY AUTOMATED COUNT: 14 % (ref 11.8–14.4)
GLUCOSE 2H P 75 G GLC PO SERPL-MCNC: 78 MG/DL
GLUCOSE TOLERANCE TEST 1 HOUR: 189 MG/DL
GLUCOSE TOLERANCE TEST 2 HOUR: 171 MG/DL
GLUCOSE TOLERANCE TEST 3 HOUR: 105 MG/DL
HCT VFR BLD AUTO: 37.7 % (ref 36.3–47.1)
HGB BLD-MCNC: 11.9 G/DL (ref 11.9–15.1)
MCH RBC QN AUTO: 29 PG (ref 25.2–33.5)
MCHC RBC AUTO-ENTMCNC: 31.6 G/DL (ref 28.4–34.8)
MCV RBC AUTO: 92 FL (ref 82.6–102.9)
NRBC BLD-RTO: 0 PER 100 WBC
PLATELET # BLD AUTO: 371 K/UL (ref 138–453)
PMV BLD AUTO: 10.3 FL (ref 8.1–13.5)
RBC # BLD AUTO: 4.1 M/UL (ref 3.95–5.11)
WBC OTHER # BLD: 13.8 K/UL (ref 3.5–11.3)

## 2024-08-06 ENCOUNTER — HOSPITAL ENCOUNTER (OUTPATIENT)
Dept: PHYSICAL THERAPY | Facility: CLINIC | Age: 34
Setting detail: THERAPIES SERIES
Discharge: HOME | End: 2024-08-06

## 2024-08-06 LAB
MICROORGANISM SPEC CULT: NORMAL
SERVICE CMNT-IMP: NORMAL
SPECIMEN DESCRIPTION: NORMAL

## 2024-08-06 NOTE — FLOWSHEET NOTE
[x] [facility]  Outpatient Rehabilitation &  Therapy  [address]  P: [phone]  F: [phone]     Therapy Cancel/No Show note    Date: 2024  Patient: Lauren Franke  : 1990  MRN: [id number]    Cancels/No Shows to date:     For today's appointment patient:    [x]  Cancelled    [] Rescheduled appointment    [] No-show     Reason given by patient:    [x]  Patient ill    []  Conflicting appointment    [] No transportation      [] Conflict with work    [] No reason given    [] Weather related    [] COVID-19    [] Other:      Comments:        [x] Next appointment was confirmed    Electronically signed by: CLEMENCIA QUINN PTA

## 2024-08-08 ENCOUNTER — ROUTINE PRENATAL (OUTPATIENT)
Dept: PERINATAL CARE | Age: 34
End: 2024-08-08
Payer: COMMERCIAL

## 2024-08-08 VITALS
DIASTOLIC BLOOD PRESSURE: 66 MMHG | HEIGHT: 66 IN | WEIGHT: 248 LBS | RESPIRATION RATE: 16 BRPM | TEMPERATURE: 97.6 F | BODY MASS INDEX: 39.86 KG/M2 | HEART RATE: 72 BPM | SYSTOLIC BLOOD PRESSURE: 110 MMHG

## 2024-08-08 DIAGNOSIS — O99.213 OBESITY AFFECTING PREGNANCY IN THIRD TRIMESTER, UNSPECIFIED OBESITY TYPE: ICD-10-CM

## 2024-08-08 DIAGNOSIS — Z36.4 ULTRASOUND FOR ANTENATAL SCREENING FOR FETAL GROWTH RESTRICTION: ICD-10-CM

## 2024-08-08 DIAGNOSIS — O24.419 GESTATIONAL DIABETES MELLITUS (GDM), ANTEPARTUM, GESTATIONAL DIABETES METHOD OF CONTROL UNSPECIFIED: Primary | ICD-10-CM

## 2024-08-08 DIAGNOSIS — O44.23 PLACENTA MARGINALIS IN THIRD TRIMESTER: ICD-10-CM

## 2024-08-08 DIAGNOSIS — Z3A.33 33 WEEKS GESTATION OF PREGNANCY: ICD-10-CM

## 2024-08-08 DIAGNOSIS — O09.513 PRIMIGRAVIDA OF ADVANCED MATERNAL AGE IN THIRD TRIMESTER: ICD-10-CM

## 2024-08-08 PROCEDURE — 99999 PR OFFICE/OUTPT VISIT,PROCEDURE ONLY: CPT | Performed by: OBSTETRICS & GYNECOLOGY

## 2024-08-08 PROCEDURE — 76820 UMBILICAL ARTERY ECHO: CPT | Performed by: OBSTETRICS & GYNECOLOGY

## 2024-08-08 PROCEDURE — 76819 FETAL BIOPHYS PROFIL W/O NST: CPT | Performed by: OBSTETRICS & GYNECOLOGY

## 2024-08-08 PROCEDURE — 76816 OB US FOLLOW-UP PER FETUS: CPT | Performed by: OBSTETRICS & GYNECOLOGY

## 2024-08-08 NOTE — PROGRESS NOTES
Obstetric/Gynecology Maternal Fetal Medicine Resident Note    Patient declines formal consult with MFM attending physician for gestational diabetes.     Rhonda Hills DO  OBGYN Resident, PGY2  [facility]  8/8/2024, 7:55 AM

## 2024-08-08 NOTE — PROGRESS NOTES
Please refer to non-invasive prenatal testing/NIPT results (via [facility]).     Please refer to maternal carrier results from [place]/NicOx carrier screening testing.     Options with respect to offering the patient invasive genetic prenatal testing could not be completed today, as the patient again declines a Maternal-Fetal Medicine physician consultation.     Patient again declines a Maternal-Fetal Medicine complete physician consultation today regarding the fetal and/or maternal medical/obstetrical complications/co-morbidities of pregnancy (for maternal comorbidities and newly diagnosed gestational diabetes).    Maternal-Fetal Medicine (MFM) attending physician will defer all management for these medical/obstetrical complications of pregnancy to the primary attending obstetrical physician/provider, as a result.  Therefore, only an ultrasound evaluation was completed today in the MFM office.      Please refer to Maternal-Fetal Medicine OBGYN resident progress note in EPIC.

## 2024-08-12 ENCOUNTER — ROUTINE PRENATAL (OUTPATIENT)
Dept: OBGYN CLINIC | Age: 34
End: 2024-08-12

## 2024-08-12 VITALS — BODY MASS INDEX: 40.67 KG/M2 | SYSTOLIC BLOOD PRESSURE: 122 MMHG | WEIGHT: 252 LBS | DIASTOLIC BLOOD PRESSURE: 72 MMHG

## 2024-08-12 DIAGNOSIS — O99.810 ABNORMAL GLUCOSE AFFECTING PREGNANCY: ICD-10-CM

## 2024-08-12 DIAGNOSIS — E66.09 OTHER OBESITY DUE TO EXCESS CALORIES AFFECTING PREGNANCY IN THIRD TRIMESTER: ICD-10-CM

## 2024-08-12 DIAGNOSIS — Z3A.33 33 WEEKS GESTATION OF PREGNANCY: Primary | ICD-10-CM

## 2024-08-12 DIAGNOSIS — O24.410 DIET CONTROLLED GESTATIONAL DIABETES MELLITUS (GDM) IN THIRD TRIMESTER: ICD-10-CM

## 2024-08-12 DIAGNOSIS — O09.93 HIGH-RISK PREGNANCY IN THIRD TRIMESTER: ICD-10-CM

## 2024-08-12 DIAGNOSIS — O99.213 OTHER OBESITY DUE TO EXCESS CALORIES AFFECTING PREGNANCY IN THIRD TRIMESTER: ICD-10-CM

## 2024-08-12 PROCEDURE — 0502F SUBSEQUENT PRENATAL CARE: CPT | Performed by: STUDENT IN AN ORGANIZED HEALTH CARE EDUCATION/TRAINING PROGRAM

## 2024-08-12 RX ORDER — BLOOD-GLUCOSE METER
EACH MISCELLANEOUS
Qty: 1 EACH | Refills: 0 | Status: SHIPPED | OUTPATIENT
Start: 2024-08-12

## 2024-08-12 RX ORDER — LANCETS 30 GAUGE
1 EACH MISCELLANEOUS 4 TIMES DAILY
Qty: 100 EACH | Refills: 3 | Status: SHIPPED | OUTPATIENT
Start: 2024-08-12

## 2024-08-12 RX ORDER — GLUCOSAMINE HCL/CHONDROITIN SU 500-400 MG
CAPSULE ORAL 4 TIMES DAILY
Qty: 100 STRIP | Refills: 0 | Status: SHIPPED | OUTPATIENT
Start: 2024-08-12

## 2024-08-12 NOTE — PROGRESS NOTES
Prenatal Visit    Lauren Franke is a 34 y.o. female  at 33w4d    The patient was seen and evaluated. She has no complaints. There was positive fetal movements. She denies contractions, vaginal bleeding and leakage of fluid. Signs and symptoms of  labor as well as labor were reviewed. The S/S of Pre-Eclampsia were reviewed with the patient in detail. She is to report any of these if they occur. She currently denies any of these.    Discussed resident involvement in triage and labor and delivery process. Discussed call group. All questions answered. Patient verbalized understanding and agreement.     Vitals:  /72   Wt 114.3 kg (252 lb)   LMP 10/10/2023 (Exact Date)   BMI 40.67 kg/m²     Assessment & Plan:  Lauren Franke is a 34 y.o. female  at 33w4d   - 28 week labs completed   - Influenza and Covid vaccinations recommended per ACOG: R/B/A discussed with increased risk of both maternal and fetal morbidity and mortality in unvaccinated pregnant patients.    - TDAP and RSV vaccinations recommended per ACOG. R/B/A discussed. She understands must received RSV vaccine at pharmacy.   - Continue prenatal vitamin    Discussed newly diagnosed GDMA1. Referral to [facility] sent. Diabetic testing supplies sent. Check sugars and send to the office weekly. The patient was counseled that poorly controlled GDM increases risk of maternal complications of hypertensive disorders,  delivery, operative vaginal delivery, and  delivery.  Fetal or  complications include congenital malformations, macrosomia, birth injury,  hypoglycemia and jaundice.      FOB Name: Mark-    Last Pap Smear:  NILM (no hx abnormal over 30 good for 5 years)  [x] Urine collected for culture 2024 Lara Herring LPN    [x] Negative date    [] Positive date   [x] UDS collected 2024 Maureen Barr MA  [x] Gc/ct collected 2024 Maureen Barr MA  [x] Prenatal Labs completed Prema REEVES

## 2024-08-23 ENCOUNTER — HOSPITAL ENCOUNTER (OUTPATIENT)
Dept: PHYSICAL THERAPY | Facility: CLINIC | Age: 34
Setting detail: THERAPIES SERIES
Discharge: HOME | End: 2024-08-23
Payer: COMMERCIAL

## 2024-08-23 PROCEDURE — 97530 THERAPEUTIC ACTIVITIES: CPT

## 2024-08-23 PROCEDURE — 97140 MANUAL THERAPY 1/> REGIONS: CPT

## 2024-08-23 PROCEDURE — 97110 THERAPEUTIC EXERCISES: CPT

## 2024-08-23 NOTE — FLOWSHEET NOTE
[x] [facility]  Outpatient Rehabilitation &  Therapy  [address]  P: [phone]  F: [phone]     Physical Therapy Daily Treatment Note    Date:  2024  Patient Name:  Lauren Franke    :  1990  MRN: [id number]  Physician: Hillary Quinn                           Insurance: [facility] only 4 modalities per visit   Medical Diagnosis: O26.899, R10.2 (ICD-10-CM) - Pelvic pain in pregnancy    Rehab Codes: M54.9, M79.1, R26.2NEC, R29.3, N39.46, M62.81, M99.05, M62.08   Onset Date: 24                         Next 's appt.: 7/3/24  Visit# / total visits:      Cancels/No Shows: 0    Subjective:    Pain:  [x] Yes  [] No  Location: L SI  Pain Rating: (0-10 scale) \"sore\"  Pain altered Tx:  [x] No  [] Yes  Action:    Comments: Pt feeling sore today after doing some gardening yesterday. Overall pt has not been feeling too bad.     Objective:  Modalities:   Kinesiotape: 4 long I's: 2 // to linea alba and 2 horizontal placed from ASIS to ASIS- not  d/t lingering skin irritation and adhesive from pt taping herself last week  Precautions: Pregnant - Due 24; MFM for monitoring marginal cord & anterior placenta placement   Exercises: HEP Review - [employer] access code: [email or web address] - pt own stability ball, yoga block, play-ball, TRX for exercise at home  KT= Kinesiotape  PB= pelvic brace (DB-exhale+ TA contraction + kegel)  DB= diaphragmatic breathing  Exercise Reps/ Time Weight/ Level Comments   Nustep            Pelvic model & education    Analogies - IAP & use of pelvic brace to reduce pressure/coning  Diaphragm & pelvic floor relationship (visual aid) in relation to pressure management & function     Squatty potty for all voiding  log rolling    keep ribs over pelvis   lumbar roll - use towel  carseat adjustment      Serola Pelvic Belt     exhale with effort & movement     kinesiotape - waterproof 4-7days  - pressure/ pull to remove - ensure not too

## 2024-08-28 ENCOUNTER — ROUTINE PRENATAL (OUTPATIENT)
Dept: OBGYN CLINIC | Age: 34
End: 2024-08-28

## 2024-08-28 VITALS — DIASTOLIC BLOOD PRESSURE: 80 MMHG | BODY MASS INDEX: 40.35 KG/M2 | SYSTOLIC BLOOD PRESSURE: 122 MMHG | WEIGHT: 250 LBS

## 2024-08-28 DIAGNOSIS — Z3A.35 35 WEEKS GESTATION OF PREGNANCY: ICD-10-CM

## 2024-08-28 DIAGNOSIS — O43.199 MARGINAL INSERTION OF UMBILICAL CORD AFFECTING MANAGEMENT OF MOTHER: ICD-10-CM

## 2024-08-28 DIAGNOSIS — O24.419 GESTATIONAL DIABETES MELLITUS (GDM) IN THIRD TRIMESTER, GESTATIONAL DIABETES METHOD OF CONTROL UNSPECIFIED: ICD-10-CM

## 2024-08-28 DIAGNOSIS — O09.93 HIGH-RISK PREGNANCY IN THIRD TRIMESTER: Primary | ICD-10-CM

## 2024-08-28 DIAGNOSIS — O99.212 OBESITY AFFECTING PREGNANCY IN SECOND TRIMESTER, UNSPECIFIED OBESITY TYPE: ICD-10-CM

## 2024-08-28 DIAGNOSIS — O09.523 MULTIGRAVIDA OF ADVANCED MATERNAL AGE IN THIRD TRIMESTER: ICD-10-CM

## 2024-08-28 PROCEDURE — 0502F SUBSEQUENT PRENATAL CARE: CPT | Performed by: NURSE PRACTITIONER

## 2024-08-28 RX ORDER — INSULIN HUMAN 100 [IU]/ML
5 INJECTION, SUSPENSION SUBCUTANEOUS NIGHTLY
Qty: 5 ADJUSTABLE DOSE PRE-FILLED PEN SYRINGE | Refills: 0 | Status: SHIPPED | OUTPATIENT
Start: 2024-08-28

## 2024-08-28 RX ORDER — GLUCOSAMINE HCL/CHONDROITIN SU 500-400 MG
CAPSULE ORAL 4 TIMES DAILY
Qty: 200 STRIP | Refills: 0 | Status: SHIPPED | OUTPATIENT
Start: 2024-08-28

## 2024-08-28 NOTE — PROGRESS NOTES
Presents for OB visit  Gestation 35w6d  Estimated Date of Delivery: 24    Insurance: [employer]    IPV bag/mug given:2024   Genetic Information given/reviewed: 2024 SOL Miles CNM   1st trimester education packet given: 2024 SOL Miles CNM   2nd trimester education packet given: 4/10/2024 Prema Ibrahim DO   3rd trimester education packet given:      FOB Name: Mark-    **Dating off USN**    Last Pap Smear:  NILM (no hx abnormal over 30 good for 5 years)  [x] Urine collected for culture 2024 Lara Herring LPN    [x] Negative date    [] Positive date   [x] UDS collected 2024 Maureen Barr MA  [x] Gc/ct collected 2024 Maureen Barr MA  [x] Prenatal Labs completed Prema Ibrahim DO     Genetic Screening:  [x]Accepted NIPS 3/13/2024 SOL Miles CNM   [x]Completed  [x] Low risk female      Carrier Screening:  [x] Accepted 3/13/2024 SOL Miles CNM   [x] Results negative Horizon 14    Baby aspirin screen:  [x]Positive (parity and BMI)  []Negative    Early 1 hour GTT:  [x]Yes (BMI) 139 fail. 3 hour ordered and passed 0/4 elevations on   [] No    AFP: declined  []Ordered  []Completed    Anatomy scan:  [x]Referral Sent 2024 Lara Herring LPN   [x]Scheduled 24  [x]Completed Placenta anterior,normal cord insertion: No and MARGINAL CORD INSERTION cervical length 4.38 cm, NO low lying placenta, no placenta previa .   [x] Follow up USN 6/10/24    Fetal Echo:   [] Yes  [x] No    High Risk Factors:  Obesity   - pregravid BMI 38  - early 1 hour FAILED, passed early 3 hr GTT 0/4 elevations   - baby asa   -  testing 37w  Abn Glucose in pregnancy > GDMA1  - elevated early 1 hour, early 3 hour WNL  - failed 3 hour at 32 week  - Nourish referral, testing supplies, glucose logs 2024 Prema Ibrahim, DO   Marginal Cord Insertion  - dx on anatomy  -following at [facility]  AMA  -

## 2024-08-28 NOTE — PATIENT INSTRUCTIONS
After Hours Number: You can call the office [phone]  or [phone]  Call if you have:  1.  Bleeding like a period  2.  Cramps or contractions greater than 2 hours  3.  If you are leaking fluid  4.  If you've a fever greater than 100°  5.  If you feel as if baby is not moving  6. If you have continuous vomiting over 3-4 hours   Counting Your Baby's Kicks: Care Instructions  Your Care Instructions    Counting your baby's kicks is one way your doctor can tell that your baby is healthy. Most women--especially in a first pregnancy--feel their baby move for the first time between 16 and 22 weeks. The movement may feel like flutters rather than kicks. Your baby may move more at certain times of the day. When you are active, you may notice less kicking than when you are resting. At your prenatal visits, your doctor will ask whether the baby is active.  In your last trimester, your doctor may ask you to count the number of times you feel your baby move.  Follow-up care is a key part of your treatment and safety. Be sure to make and go to all appointments, and call your doctor if you are having problems. It's also a good idea to know your test results and keep a list of the medicines you take.  How do you count fetal kicks?  A common method of checking your baby's movement is to count the number of kicks or moves you feel in 1 hour. Ten movements (such as kicks, flutters, or rolls) in 1 hour are normal. Some doctors suggest that you count in the morning until you get to 10 movements. Then you can quit for that day and start again the next day.  Pick your baby's most active time of day to count. This may be any time from morning to evening.  If you do not feel 10 movements in an hour, your baby may be sleeping. Wait for the next hour and count again.  When should you call for help?  Call your doctor now or seek immediate medical care if:    You noticed that your baby has stopped moving or is moving much less than

## 2024-08-29 ENCOUNTER — ROUTINE PRENATAL (OUTPATIENT)
Dept: OBGYN CLINIC | Age: 34
End: 2024-08-29

## 2024-08-29 ENCOUNTER — HOSPITAL ENCOUNTER (OUTPATIENT)
Age: 34
Setting detail: SPECIMEN
Discharge: HOME | End: 2024-08-29

## 2024-08-29 VITALS
SYSTOLIC BLOOD PRESSURE: 120 MMHG | HEART RATE: 82 BPM | BODY MASS INDEX: 40.64 KG/M2 | WEIGHT: 251.8 LBS | DIASTOLIC BLOOD PRESSURE: 64 MMHG

## 2024-08-29 DIAGNOSIS — O09.93 HIGH-RISK PREGNANCY IN THIRD TRIMESTER: Primary | ICD-10-CM

## 2024-08-29 DIAGNOSIS — O99.212 OBESITY AFFECTING PREGNANCY IN SECOND TRIMESTER, UNSPECIFIED OBESITY TYPE: ICD-10-CM

## 2024-08-29 DIAGNOSIS — O09.523 MULTIGRAVIDA OF ADVANCED MATERNAL AGE IN THIRD TRIMESTER: ICD-10-CM

## 2024-08-29 DIAGNOSIS — O24.419 GESTATIONAL DIABETES MELLITUS (GDM) IN THIRD TRIMESTER, GESTATIONAL DIABETES METHOD OF CONTROL UNSPECIFIED: ICD-10-CM

## 2024-08-29 DIAGNOSIS — O43.199 MARGINAL INSERTION OF UMBILICAL CORD AFFECTING MANAGEMENT OF MOTHER: ICD-10-CM

## 2024-08-29 DIAGNOSIS — Z3A.36 36 WEEKS GESTATION OF PREGNANCY: ICD-10-CM

## 2024-08-29 PROCEDURE — 0502F SUBSEQUENT PRENATAL CARE: CPT | Performed by: NURSE PRACTITIONER

## 2024-08-29 NOTE — PROGRESS NOTES
5units nightly 2024 - reviewed needs growth every 4 weeks and  testing   Marginal Cord Insertion  - dx on anatomy  -following at [facility]  AMA  -  testing 37w  -NIPT low risk      []Placed on High Risk List    Fetal Surveillance:  [x] Yes 37 weeks d/t BMI  [] No    Covid Vaccine:DECLINED 2024 Lara Herring LPN   Flu Vaccine:  [x]Given 10/23 2024 Lara Herring LPN   [] Declined   Tdap: Given 24 Drew Cloud MA   [x]Given   [] Declined   Rhogam:  []Given   [x] Not indicated     Early 1hr GTT: Failed  Early 3hr GTT: passed, 59/131/122/100  Repeat 3 hr GTT (completed at 32 weeks) FAILED - GDMA    []Breast Pump Order Signed/Sent    36 weeks US:  []Completed     GBS:  [] Positive   [] Negative from **    Apts with :  [x] Date: 7/3/2024 Gestational Age: 27w6d  [] Date: ** Gestational Age: **    Apts with :  [x] Date: 4/10/24 Gestational Age: 15w6d  [x] Date:  Gestational Age: 33w    Depression/Anxiety screening (date/results/sign off)  1st trimester 2024 Lara Herring LPN   *EPDS score:3  *GAD7 score:1  *MOOD score:0    2nd trimester  *EPDS score:  *GAD7 score:    3rd trimester  *EPDS score:  *GAD7 score:       Headaches: no  Swelling: no  Bleeding: no  Discharge: no   Dysuria: no  Nausea: no  Heartburn: no  Epigastric pain: no  Contractions: no  Leakage of fluid: no  + fetal movement      O:  Vitals:    24 0815   BP: 120/64   Pulse: 82   Weight: 114.2 kg (251 lb 12.8 oz)        Baseline:  135   Variability:  Moderate  Accelerations:  Present  Decelerations:  Absent  Fetal Movement:  Perceived by patient during NST  Contractions:  Absent  Time: 20 minutes       A/P:    1. High-risk pregnancy in third trimester  rNST/BPP   - Fetal nonstress test; Future    2. 36 weeks gestation of pregnancy    - Fetal nonstress test; Future  - Culture, Strep B Screen, Vaginal/Rectal; Future    3. Gestational diabetes mellitus (GDM) in third trimester, gestational diabetes  method of control unspecified  Reviewed patient is a gestational diabetic controlled with insulin- starte don insulin today   Current Plan of care includes   [] Diet controlled  [x] Insulin controlled. Regimen 0/0/0/5- started 24  [x] Monitoring blood sugars fasting in the am and q2 hours after breakfast/lunch/dinner  [x] Fetal Growth scans every 4-6 weeks- needs next week with BPP   [] Managed by MFM  [x] Managed by / Dr. Ibrahim   [x] Sending blood sugars in weekly for review  [x] NST/BPP weekly after 32 weeks  [x]Agreeable with recommendations for delivery between 39w0d - 39w6d (medication controlled)  []Agreeable with recommendations for delivery between 39w0d and 40w6d (non medication controlled)  []IOL scheduled   []  delivery scheduled      Lauren Franke is agreeable with this plan .  Reviewed with Lauren Franke three meals and two to three snacks are recommended to distribute carbohydrate intake to reduce postprandial glucose fluctuations.  Reviewed women with GDM should aim for 30 minutes of moderate-intensity aerobic exercise at least 5 days a weeks or a minimum of 150 minutes per week.  Lauren Franke has been compliant with monitoring blood sugars and sending in for review.  Strongly advised patient to be compliant with blood sugar monitoring as advised to minimize the potential of adverse  complications (eg. Fetal demise/stillbirth, polyhydramnios/oligohydramnios, fetal growth abnormalities, pregnancy loss, hypertensive disorders of pregnancy, indicated  delivery, etc) and potential maternal morbidities, etc.   Recommended  fasting BS <90, and 2 hr PP<120.   - Fetal nonstress test; Future    4. Obesity affecting pregnancy in second trimester, unspecified obesity type  - pregravid BMI 38  - early 1 hour FAILED, passed early 3 hr GTT 0/4 elevations   - baby asa   -  testing 37w  - Fetal nonstress test; Future    5. Multigravida of advanced maternal age in

## 2024-08-29 NOTE — PROGRESS NOTES
Gestational age 36w0d  Indications OBESITY, GDMA 1, MARGINAL CORD INSERTION, AMA  NST started 8:14  /64  Pulse 82  Weight 251.8LB  Patient recording movements. Patient stated fetal movement ACTIVE, NO ISSUES

## 2024-08-29 NOTE — PATIENT INSTRUCTIONS
After Hours Number: You can call the office [phone]  or [phone]  Call if you have:  1.  Bleeding like a period  2.  Cramps or contractions greater than 2 hours  3.  If you are leaking fluid  4.  If you've a fever greater than 100°  5.  If you feel as if baby is not moving  6. If you have continuous vomiting over 3-4 hours   Counting Your Baby's Kicks: Care Instructions  Your Care Instructions    Counting your baby's kicks is one way your doctor can tell that your baby is healthy. Most women--especially in a first pregnancy--feel their baby move for the first time between 16 and 22 weeks. The movement may feel like flutters rather than kicks. Your baby may move more at certain times of the day. When you are active, you may notice less kicking than when you are resting. At your prenatal visits, your doctor will ask whether the baby is active.  In your last trimester, your doctor may ask you to count the number of times you feel your baby move.  Follow-up care is a key part of your treatment and safety. Be sure to make and go to all appointments, and call your doctor if you are having problems. It's also a good idea to know your test results and keep a list of the medicines you take.  How do you count fetal kicks?  A common method of checking your baby's movement is to count the number of kicks or moves you feel in 1 hour. Ten movements (such as kicks, flutters, or rolls) in 1 hour are normal. Some doctors suggest that you count in the morning until you get to 10 movements. Then you can quit for that day and start again the next day.  Pick your baby's most active time of day to count. This may be any time from morning to evening.  If you do not feel 10 movements in an hour, your baby may be sleeping. Wait for the next hour and count again.  When should you call for help?  Call your doctor now or seek immediate medical care if:    You noticed that your baby has stopped moving or is moving much less than  ask your healthcare professional. [employer] disclaims any warranty or liability for your use of this information.          Labor: Care Instructions  Your Care Instructions     labor is the start of labor between 20 and 36 weeks of pregnancy. A full-term pregnancy lasts 37 to 42 weeks. In labor, the uterus contracts to open the cervix. This is the first stage of childbirth.  labor can be caused by a problem with the baby, the mother, or both. Often the cause is not known.  In some cases, doctors use medicines to try to delay labor until 34 or more weeks of pregnancy. By this time, a baby has grown enough so that problems are not likely. In some cases--such as with a serious infection--it is healthier for the baby to be born early. Your treatment will depend on how far along you are in your pregnancy and on your health and your baby's health.  Follow-up care is a key part of your treatment and safety. Be sure to make and go to all appointments, and call your doctor if you are having problems. It's also a good idea to know your test results and keep a list of the medicines you take.  How can you care for yourself at home?  If your doctor prescribed medicines, take them exactly as directed. Call your doctor if you think you are having a problem with your medicine.  Rest until your doctor advises you about activity. He or she will tell you if you should stay in bed most of the time. You may need to arrange for  if you have young children.  Do not have sexual intercourse unless your doctor says it is safe.  Use pads, not tampons, if you have vaginal bleeding.  Make sure to drink plenty of fluids. Dehydration can lead to contractions. If you have kidney, heart, or liver disease and have to limit fluids, talk with your doctor before you increase the amount of fluids you drink.  Do not smoke or allow others to smoke around you. If you need help quitting, talk to your doctor about

## 2024-09-01 ENCOUNTER — PATIENT MESSAGE (OUTPATIENT)
Dept: OBGYN CLINIC | Age: 34
End: 2024-09-01

## 2024-09-01 LAB
MICROORGANISM SPEC CULT: NORMAL
SERVICE CMNT-IMP: NORMAL
SPECIMEN DESCRIPTION: NORMAL

## 2024-09-04 ENCOUNTER — HOSPITAL ENCOUNTER (OUTPATIENT)
Dept: PHYSICAL THERAPY | Facility: CLINIC | Age: 34
Setting detail: THERAPIES SERIES
Discharge: HOME | End: 2024-09-04
Payer: COMMERCIAL

## 2024-09-04 PROCEDURE — 97530 THERAPEUTIC ACTIVITIES: CPT

## 2024-09-04 PROCEDURE — 97112 NEUROMUSCULAR REEDUCATION: CPT

## 2024-09-04 NOTE — FLOWSHEET NOTE
[x] [facility]  Outpatient Rehabilitation &  Therapy  [address]  P: [phone]  F: [phone]     Physical Therapy Daily Treatment Note    Date:  2024  Patient Name:  Lauren Franke    :  1990  MRN: [id number]  Physician: Hillary Quinn                           Insurance: [facility] only 4 modalities per visit   Medical Diagnosis: O26.899, R10.2 (ICD-10-CM) - Pelvic pain in pregnancy    Rehab Codes: M54.9, M79.1, R26.2NEC, R29.3, N39.46, M62.81, M99.05, M62.08   Onset Date: 24                         Next 's appt.: 7/3/24  Visit# / total visits:      Cancels/No Shows: 0    Subjective:    Pain:  [] Yes  [x] No  Location: L SI  Pain Rating: (0-10 scale) \"sore\"  Pain altered Tx:  [x] No  [] Yes  Action:    Comments: Pt comes in feeling good overall noting she is eager to learn proper push mechanics.     Objective:  Consent: Patient verbally consented to external sEMG assessment with no red flags present 2024. Patient was appropriately draped and only areas that were being treated were exposed. Therapist provided detailed explanation of treatment prior to initiation of session. Patient verbalized and demonstrated understanding and provided verbal consent. Consent was checked and received prior to initiating different treatment techniques and checked frequently throughout session.    Modalities:   Kinesiotape: 4 long I's: 2 // to linea alba and 2 horizontal placed from ASIS to ASIS- not  d/t lingering skin irritation and adhesive from pt taping herself last week  Precautions: Pregnant - Due 24; MFM for monitoring marginal cord & anterior placenta placement   Exercises: [facility] Review - [employer] access code: [email or web address] - pt own stability ball, yoga block, play-ball, TRX for exercise at home  KT= Kinesiotape  PB= pelvic brace (DB-exhale+ TA contraction + kegel)  DB= diaphragmatic breathing  Exercise Reps/ Time Weight/ Level Comments   Nustep

## 2024-09-06 ENCOUNTER — ROUTINE PRENATAL (OUTPATIENT)
Dept: OBGYN CLINIC | Age: 34
End: 2024-09-06

## 2024-09-06 VITALS
BODY MASS INDEX: 41.03 KG/M2 | SYSTOLIC BLOOD PRESSURE: 118 MMHG | WEIGHT: 254.2 LBS | DIASTOLIC BLOOD PRESSURE: 72 MMHG | HEART RATE: 63 BPM

## 2024-09-06 DIAGNOSIS — O43.199 MARGINAL INSERTION OF UMBILICAL CORD AFFECTING MANAGEMENT OF MOTHER: ICD-10-CM

## 2024-09-06 DIAGNOSIS — O99.212 OBESITY AFFECTING PREGNANCY IN SECOND TRIMESTER, UNSPECIFIED OBESITY TYPE: ICD-10-CM

## 2024-09-06 DIAGNOSIS — O24.419 GESTATIONAL DIABETES MELLITUS (GDM) IN THIRD TRIMESTER, GESTATIONAL DIABETES METHOD OF CONTROL UNSPECIFIED: ICD-10-CM

## 2024-09-06 DIAGNOSIS — O09.93 HIGH-RISK PREGNANCY IN THIRD TRIMESTER: Primary | ICD-10-CM

## 2024-09-06 DIAGNOSIS — O09.523 MULTIGRAVIDA OF ADVANCED MATERNAL AGE IN THIRD TRIMESTER: ICD-10-CM

## 2024-09-06 NOTE — PROGRESS NOTES
Prenatal Visit    Lauren Franke is a 34 y.o. female  at 37w1d    The patient was seen and evaluated. She has no complaints. There was positive fetal movements. She denies contractions, vaginal bleeding and leakage of fluid. Signs and symptoms of labor were reviewed.  The S/S of Pre-Eclampsia were reviewed with the patient in detail. She is to report any of these if they occur. She currently denies any of these.    Discussed resident involvement in triage and labor and delivery.  Discussed call group.  Patient verbalized understanding and agreement.    Allergies:  Sulfa antibiotics and Naprosyn [naproxen]    Vitals:  /72   Pulse 63   Wt 115.3 kg (254 lb 3.2 oz)   LMP 10/10/2023 (Exact Date)   BMI 41.03 kg/m²     NST:  Baseline: 140  Variability: moderate  Accelerations: present  Decelerations: absent  Reactive: yes   Time: 20 min    BPP 8/8  Growth and fluid normal        Media Information      Document Information    Radiology and Imaging: Radiology/Imaging   us report   2024 10:42   Attached To:   US FETAL BIOPHYS PROFILE W NON STRESS [[id number]]   [facility]   Source Information    Cuca Stuart  [facility]   Document History        Assessment & Plan:  Lauren Franke is a 34 y.o. female  at 37w1d   - 28 week labs completed   - Influenza and Covid vaccinations recommended per ACOG: R/B/A discussed with increased risk of both maternal and fetal morbidity and mortality in unvaccinated pregnant patients.   - TDAP and RSV vaccinations recommended per ACOG. R/B/A discussed. She understands must received RSV vaccine at pharmacy.   - Continue prenatal vitamin         FOB Name: Mark-    **Dating off USN**    Last Pap Smear:  NILM (no hx abnormal over 30 good for 5 years)  [x] Urine collected for culture 2024 Lara Herring LPN    [x] Negative date    [] Positive date   [x] UDS collected 2024 Maureen Barr

## 2024-09-06 NOTE — PROGRESS NOTES
Gestational age 37W1D  Indications Obesity, GDMA1, Marginal Cord Insertion, AMA  NST started 10:24  /72  Pulse 63  Weight 254.2LB  Patient recording movements. Patient stated fetal movement ACTIVE, NO ISSUES

## 2024-09-12 ENCOUNTER — ROUTINE PRENATAL (OUTPATIENT)
Dept: OBGYN CLINIC | Age: 34
End: 2024-09-12

## 2024-09-12 VITALS — SYSTOLIC BLOOD PRESSURE: 122 MMHG | BODY MASS INDEX: 40.84 KG/M2 | DIASTOLIC BLOOD PRESSURE: 80 MMHG | WEIGHT: 253 LBS

## 2024-09-12 DIAGNOSIS — O28.8 AFI (AMNIOTIC FLUID INDEX) BORDERLINE LOW: ICD-10-CM

## 2024-09-12 DIAGNOSIS — O43.199 MARGINAL INSERTION OF UMBILICAL CORD AFFECTING MANAGEMENT OF MOTHER: ICD-10-CM

## 2024-09-12 DIAGNOSIS — O09.523 MULTIGRAVIDA OF ADVANCED MATERNAL AGE IN THIRD TRIMESTER: ICD-10-CM

## 2024-09-12 DIAGNOSIS — O99.212 OBESITY AFFECTING PREGNANCY IN SECOND TRIMESTER, UNSPECIFIED OBESITY TYPE: ICD-10-CM

## 2024-09-12 DIAGNOSIS — Z3A.38 38 WEEKS GESTATION OF PREGNANCY: ICD-10-CM

## 2024-09-12 DIAGNOSIS — O24.419 GESTATIONAL DIABETES MELLITUS (GDM) IN THIRD TRIMESTER, GESTATIONAL DIABETES METHOD OF CONTROL UNSPECIFIED: ICD-10-CM

## 2024-09-12 DIAGNOSIS — O09.93 HIGH-RISK PREGNANCY IN THIRD TRIMESTER: Primary | ICD-10-CM

## 2024-09-12 PROCEDURE — 0502F SUBSEQUENT PRENATAL CARE: CPT | Performed by: NURSE PRACTITIONER

## 2024-09-12 RX ORDER — INSULIN HUMAN 100 [IU]/ML
10 INJECTION, SUSPENSION SUBCUTANEOUS NIGHTLY
Qty: 5 ADJUSTABLE DOSE PRE-FILLED PEN SYRINGE | Refills: 0 | Status: SHIPPED | OUTPATIENT
Start: 2024-09-12

## 2024-09-13 ENCOUNTER — HOSPITAL ENCOUNTER (OUTPATIENT)
Age: 34
Setting detail: OBSERVATION
Discharge: HOME | End: 2024-09-13
Attending: OBSTETRICS & GYNECOLOGY | Admitting: OBSTETRICS & GYNECOLOGY
Payer: COMMERCIAL

## 2024-09-13 VITALS
SYSTOLIC BLOOD PRESSURE: 133 MMHG | RESPIRATION RATE: 16 BRPM | OXYGEN SATURATION: 98 % | TEMPERATURE: 97.7 F | HEART RATE: 85 BPM | DIASTOLIC BLOOD PRESSURE: 74 MMHG

## 2024-09-13 PROBLEM — Z3A.38 38 WEEKS GESTATION OF PREGNANCY: Status: ACTIVE | Noted: 2024-09-13

## 2024-09-13 LAB
BACTERIA URNS QL MICRO: ABNORMAL
BILIRUB UR QL STRIP: NEGATIVE
C TRACH DNA SPEC QL PROBE+SIG AMP: NORMAL
CANDIDA SPECIES: NEGATIVE
CASTS #/AREA URNS LPF: ABNORMAL /LPF (ref 0–8)
CLARITY UR: CLEAR
COLOR UR: YELLOW
EPI CELLS #/AREA URNS HPF: ABNORMAL /HPF (ref 0–5)
GARDNERELLA VAGINALIS: NEGATIVE
GLUCOSE UR STRIP-MCNC: NEGATIVE MG/DL
HGB UR QL STRIP.AUTO: NEGATIVE
KETONES UR STRIP-MCNC: ABNORMAL MG/DL
LEUKOCYTE ESTERASE UR QL STRIP: ABNORMAL
N GONORRHOEA DNA SPEC QL PROBE+SIG AMP: NORMAL
NITRITE UR QL STRIP: NEGATIVE
PH UR STRIP: 6 [PH] (ref 5–8)
PROT UR STRIP-MCNC: NEGATIVE MG/DL
RBC #/AREA URNS HPF: ABNORMAL /HPF (ref 0–4)
SOURCE: NORMAL
SP GR UR STRIP: 1 (ref 1–1.03)
SPECIMEN DESCRIPTION: NORMAL
TRICHOMONAS: NEGATIVE
UROBILINOGEN UR STRIP-ACNC: NORMAL EU/DL (ref 0–1)
WBC #/AREA URNS HPF: ABNORMAL /HPF (ref 0–5)

## 2024-09-13 PROCEDURE — 87491 CHLMYD TRACH DNA AMP PROBE: CPT

## 2024-09-13 PROCEDURE — 87591 N.GONORRHOEAE DNA AMP PROB: CPT

## 2024-09-13 PROCEDURE — G0378 HOSPITAL OBSERVATION PER HR: HCPCS

## 2024-09-13 PROCEDURE — 81001 URINALYSIS AUTO W/SCOPE: CPT

## 2024-09-13 PROCEDURE — 99213 OFFICE O/P EST LOW 20 MIN: CPT

## 2024-09-13 PROCEDURE — 87510 GARDNER VAG DNA DIR PROBE: CPT

## 2024-09-13 PROCEDURE — 87660 TRICHOMONAS VAGIN DIR PROBE: CPT

## 2024-09-13 PROCEDURE — 87086 URINE CULTURE/COLONY COUNT: CPT

## 2024-09-13 PROCEDURE — 87480 CANDIDA DNA DIR PROBE: CPT

## 2024-09-13 RX ORDER — ONDANSETRON 2 MG/ML
4 INJECTION INTRAMUSCULAR; INTRAVENOUS EVERY 6 HOURS PRN
Status: DISCONTINUED | OUTPATIENT
Start: 2024-09-13 | End: 2024-09-14 | Stop reason: HOSPADM

## 2024-09-13 RX ORDER — ACETAMINOPHEN 500 MG
1000 TABLET ORAL EVERY 8 HOURS SCHEDULED
Status: DISCONTINUED | OUTPATIENT
Start: 2024-09-13 | End: 2024-09-14 | Stop reason: HOSPADM

## 2024-09-13 RX ORDER — ONDANSETRON 4 MG/1
4 TABLET, ORALLY DISINTEGRATING ORAL EVERY 8 HOURS PRN
Status: DISCONTINUED | OUTPATIENT
Start: 2024-09-13 | End: 2024-09-14 | Stop reason: HOSPADM

## 2024-09-14 LAB
MICROORGANISM SPEC CULT: NORMAL
SERVICE CMNT-IMP: NORMAL
SPECIMEN DESCRIPTION: NORMAL

## 2024-09-17 LAB
C TRACH DNA SPEC QL PROBE+SIG AMP: NEGATIVE
N GONORRHOEA DNA SPEC QL PROBE+SIG AMP: NEGATIVE
SPECIMEN DESCRIPTION: NORMAL

## 2024-09-18 ENCOUNTER — ROUTINE PRENATAL (OUTPATIENT)
Dept: OBGYN CLINIC | Age: 34
End: 2024-09-18

## 2024-09-18 VITALS — SYSTOLIC BLOOD PRESSURE: 120 MMHG | WEIGHT: 252 LBS | BODY MASS INDEX: 40.67 KG/M2 | DIASTOLIC BLOOD PRESSURE: 70 MMHG

## 2024-09-18 DIAGNOSIS — O28.8 AFI (AMNIOTIC FLUID INDEX) BORDERLINE LOW: Primary | ICD-10-CM

## 2024-09-18 DIAGNOSIS — O43.199 MARGINAL INSERTION OF UMBILICAL CORD AFFECTING MANAGEMENT OF MOTHER: ICD-10-CM

## 2024-09-18 DIAGNOSIS — O09.93 HIGH-RISK PREGNANCY IN THIRD TRIMESTER: ICD-10-CM

## 2024-09-18 DIAGNOSIS — O99.212 OBESITY AFFECTING PREGNANCY IN SECOND TRIMESTER, UNSPECIFIED OBESITY TYPE: ICD-10-CM

## 2024-09-18 DIAGNOSIS — O09.523 MULTIGRAVIDA OF ADVANCED MATERNAL AGE IN THIRD TRIMESTER: ICD-10-CM

## 2024-09-18 DIAGNOSIS — O24.419 GESTATIONAL DIABETES MELLITUS (GDM) IN THIRD TRIMESTER, GESTATIONAL DIABETES METHOD OF CONTROL UNSPECIFIED: ICD-10-CM

## 2024-09-20 ENCOUNTER — APPOINTMENT (OUTPATIENT)
Dept: PHYSICAL THERAPY | Facility: CLINIC | Age: 34
End: 2024-09-20
Payer: COMMERCIAL

## 2024-09-22 ENCOUNTER — APPOINTMENT (OUTPATIENT)
Dept: LABOR AND DELIVERY | Age: 34
End: 2024-09-22
Payer: COMMERCIAL

## 2024-09-22 ENCOUNTER — HOSPITAL ENCOUNTER (INPATIENT)
Age: 34
LOS: 3 days | Discharge: HOME | End: 2024-09-25
Attending: OBSTETRICS & GYNECOLOGY | Admitting: STUDENT IN AN ORGANIZED HEALTH CARE EDUCATION/TRAINING PROGRAM
Payer: COMMERCIAL

## 2024-09-22 PROBLEM — Z3A.38 38 WEEKS GESTATION OF PREGNANCY: Status: RESOLVED | Noted: 2024-09-13 | Resolved: 2024-09-22

## 2024-09-22 PROBLEM — Z3A.39 39 WEEKS GESTATION OF PREGNANCY: Status: ACTIVE | Noted: 2024-09-22

## 2024-09-22 LAB
ABO + RH BLD: NORMAL
AMPHET UR QL SCN: NEGATIVE
ARM BAND NUMBER: NORMAL
BARBITURATES UR QL SCN: NEGATIVE
BASOPHILS # BLD: 0.05 K/UL (ref 0–0.2)
BASOPHILS NFR BLD: 0 % (ref 0–2)
BENZODIAZ UR QL: NEGATIVE
BLOOD BANK SAMPLE EXPIRATION: NORMAL
BLOOD GROUP ANTIBODIES SERPL: NEGATIVE
CANNABINOIDS UR QL SCN: NEGATIVE
COCAINE UR QL SCN: NEGATIVE
EOSINOPHIL # BLD: 0.14 K/UL (ref 0–0.44)
EOSINOPHILS RELATIVE PERCENT: 1 % (ref 1–4)
ERYTHROCYTE [DISTWIDTH] IN BLOOD BY AUTOMATED COUNT: 13.6 % (ref 11.8–14.4)
FENTANYL UR QL: NEGATIVE
GLUCOSE BLD-MCNC: 121 MG/DL (ref 65–105)
HCT VFR BLD AUTO: 36.8 % (ref 36.3–47.1)
HGB BLD-MCNC: 12 G/DL (ref 11.9–15.1)
IMM GRANULOCYTES # BLD AUTO: 0.08 K/UL (ref 0–0.3)
IMM GRANULOCYTES NFR BLD: 1 %
LYMPHOCYTES NFR BLD: 2.21 K/UL (ref 1.1–3.7)
LYMPHOCYTES RELATIVE PERCENT: 16 % (ref 24–43)
MCH RBC QN AUTO: 28.8 PG (ref 25.2–33.5)
MCHC RBC AUTO-ENTMCNC: 32.6 G/DL (ref 28.4–34.8)
MCV RBC AUTO: 88.5 FL (ref 82.6–102.9)
METHADONE UR QL: NEGATIVE
MONOCYTES NFR BLD: 0.6 K/UL (ref 0.1–1.2)
MONOCYTES NFR BLD: 4 % (ref 3–12)
NEUTROPHILS NFR BLD: 78 % (ref 36–65)
NEUTS SEG NFR BLD: 11 K/UL (ref 1.5–8.1)
NRBC BLD-RTO: 0 PER 100 WBC
OPIATES UR QL SCN: NEGATIVE
OXYCODONE UR QL SCN: NEGATIVE
PCP UR QL SCN: NEGATIVE
PLATELET # BLD AUTO: 318 K/UL (ref 138–453)
PMV BLD AUTO: 10.1 FL (ref 8.1–13.5)
RBC # BLD AUTO: 4.16 M/UL (ref 3.95–5.11)
T PALLIDUM AB SER QL IA: NONREACTIVE
TEST INFORMATION: NORMAL
WBC OTHER # BLD: 14.1 K/UL (ref 3.5–11.3)

## 2024-09-22 PROCEDURE — 6370000000 HC RX 637 (ALT 250 FOR IP)

## 2024-09-22 PROCEDURE — 86901 BLOOD TYPING SEROLOGIC RH(D): CPT

## 2024-09-22 PROCEDURE — 86900 BLOOD TYPING SEROLOGIC ABO: CPT

## 2024-09-22 PROCEDURE — 85025 COMPLETE CBC W/AUTO DIFF WBC: CPT

## 2024-09-22 PROCEDURE — 80307 DRUG TEST PRSMV CHEM ANLYZR: CPT

## 2024-09-22 PROCEDURE — 86780 TREPONEMA PALLIDUM: CPT

## 2024-09-22 PROCEDURE — 2580000003 HC RX 258

## 2024-09-22 PROCEDURE — 1220000000 HC SEMI PRIVATE OB R&B

## 2024-09-22 PROCEDURE — 86850 RBC ANTIBODY SCREEN: CPT

## 2024-09-22 PROCEDURE — 82947 ASSAY GLUCOSE BLOOD QUANT: CPT

## 2024-09-22 RX ORDER — SODIUM CHLORIDE 9 MG/ML
INJECTION, SOLUTION INTRAVENOUS CONTINUOUS
Status: DISCONTINUED | OUTPATIENT
Start: 2024-09-22 | End: 2024-09-24 | Stop reason: HOSPADM

## 2024-09-22 RX ORDER — 0.9 % SODIUM CHLORIDE 0.9 %
500 INTRAVENOUS SOLUTION INTRAVENOUS ONCE
Status: DISCONTINUED | OUTPATIENT
Start: 2024-09-22 | End: 2024-09-24 | Stop reason: HOSPADM

## 2024-09-22 RX ORDER — SODIUM CHLORIDE 9 MG/ML
25 INJECTION, SOLUTION INTRAVENOUS PRN
Status: DISCONTINUED | OUTPATIENT
Start: 2024-09-22 | End: 2024-09-24 | Stop reason: HOSPADM

## 2024-09-22 RX ORDER — INSULIN LISPRO 100 [IU]/ML
0-12 INJECTION, SOLUTION INTRAVENOUS; SUBCUTANEOUS 4 TIMES DAILY PRN
Status: DISCONTINUED | OUTPATIENT
Start: 2024-09-22 | End: 2024-09-24

## 2024-09-22 RX ORDER — SODIUM CHLORIDE 0.9 % (FLUSH) 0.9 %
5-40 SYRINGE (ML) INJECTION EVERY 12 HOURS SCHEDULED
Status: DISCONTINUED | OUTPATIENT
Start: 2024-09-22 | End: 2024-09-24 | Stop reason: HOSPADM

## 2024-09-22 RX ORDER — SODIUM CHLORIDE 0.9 % (FLUSH) 0.9 %
5-40 SYRINGE (ML) INJECTION PRN
Status: DISCONTINUED | OUTPATIENT
Start: 2024-09-22 | End: 2024-09-24 | Stop reason: HOSPADM

## 2024-09-22 RX ORDER — ACETAMINOPHEN 500 MG
1000 TABLET ORAL EVERY 6 HOURS PRN
Status: DISCONTINUED | OUTPATIENT
Start: 2024-09-22 | End: 2024-09-24 | Stop reason: HOSPADM

## 2024-09-22 RX ORDER — LANOLIN ALCOHOL/MO/W.PET/CERES
3 CREAM (GRAM) TOPICAL ONCE
Status: COMPLETED | OUTPATIENT
Start: 2024-09-22 | End: 2024-09-22

## 2024-09-22 RX ORDER — DIPHENHYDRAMINE HCL 25 MG
25 TABLET ORAL ONCE
Status: COMPLETED | OUTPATIENT
Start: 2024-09-22 | End: 2024-09-22

## 2024-09-22 RX ADMIN — SODIUM CHLORIDE: 9 INJECTION, SOLUTION INTRAVENOUS at 16:25

## 2024-09-22 RX ADMIN — Medication 3 MG: at 22:19

## 2024-09-22 RX ADMIN — Medication 25 MCG: at 17:06

## 2024-09-22 RX ADMIN — DIPHENHYDRAMINE HYDROCHLORIDE 25 MG: 25 TABLET ORAL at 22:19

## 2024-09-22 RX ADMIN — INSULIN HUMAN 10 UNITS: 100 INJECTION, SUSPENSION SUBCUTANEOUS at 22:20

## 2024-09-22 RX ADMIN — Medication 25 MCG: at 22:19

## 2024-09-23 ENCOUNTER — ANESTHESIA (OUTPATIENT)
Dept: LABOR AND DELIVERY | Age: 34
End: 2024-09-23
Payer: COMMERCIAL

## 2024-09-23 ENCOUNTER — ANESTHESIA EVENT (OUTPATIENT)
Dept: LABOR AND DELIVERY | Age: 34
End: 2024-09-23
Payer: COMMERCIAL

## 2024-09-23 LAB
GLUCOSE BLD-MCNC: 149 MG/DL (ref 65–105)
GLUCOSE BLD-MCNC: 82 MG/DL (ref 65–105)
GLUCOSE BLD-MCNC: 87 MG/DL (ref 65–105)

## 2024-09-23 PROCEDURE — 6360000002 HC RX W HCPCS: Performed by: STUDENT IN AN ORGANIZED HEALTH CARE EDUCATION/TRAINING PROGRAM

## 2024-09-23 PROCEDURE — 6370000000 HC RX 637 (ALT 250 FOR IP)

## 2024-09-23 PROCEDURE — 82947 ASSAY GLUCOSE BLOOD QUANT: CPT

## 2024-09-23 PROCEDURE — 2500000003 HC RX 250 WO HCPCS

## 2024-09-23 PROCEDURE — 3700000025 EPIDURAL BLOCK: Performed by: STUDENT IN AN ORGANIZED HEALTH CARE EDUCATION/TRAINING PROGRAM

## 2024-09-23 PROCEDURE — 2580000003 HC RX 258

## 2024-09-23 PROCEDURE — 1220000000 HC SEMI PRIVATE OB R&B

## 2024-09-23 PROCEDURE — 6360000002 HC RX W HCPCS

## 2024-09-23 RX ORDER — NALOXONE HYDROCHLORIDE 0.4 MG/ML
INJECTION, SOLUTION INTRAMUSCULAR; INTRAVENOUS; SUBCUTANEOUS PRN
Status: DISCONTINUED | OUTPATIENT
Start: 2024-09-23 | End: 2024-09-24 | Stop reason: HOSPADM

## 2024-09-23 RX ORDER — MORPHINE SULFATE 10 MG/ML
10 INJECTION INTRAVENOUS ONCE
Status: COMPLETED | OUTPATIENT
Start: 2024-09-23 | End: 2024-09-23

## 2024-09-23 RX ORDER — ONDANSETRON 2 MG/ML
4 INJECTION INTRAMUSCULAR; INTRAVENOUS EVERY 6 HOURS PRN
Status: DISCONTINUED | OUTPATIENT
Start: 2024-09-23 | End: 2024-09-24 | Stop reason: HOSPADM

## 2024-09-23 RX ORDER — LIDOCAINE HYDROCHLORIDE AND EPINEPHRINE 15; 5 MG/ML; UG/ML
INJECTION, SOLUTION EPIDURAL
Status: DISCONTINUED | OUTPATIENT
Start: 2024-09-23 | End: 2024-09-24 | Stop reason: SDUPTHER

## 2024-09-23 RX ORDER — EPHEDRINE SULFATE 50 MG/ML
10 INJECTION INTRAVENOUS EVERY 5 MIN PRN
Status: DISCONTINUED | OUTPATIENT
Start: 2024-09-23 | End: 2024-09-24 | Stop reason: HOSPADM

## 2024-09-23 RX ORDER — PROCHLORPERAZINE EDISYLATE 5 MG/ML
10 INJECTION INTRAMUSCULAR; INTRAVENOUS ONCE
Status: COMPLETED | OUTPATIENT
Start: 2024-09-23 | End: 2024-09-23

## 2024-09-23 RX ORDER — ROPIVACAINE HYDROCHLORIDE 2 MG/ML
INJECTION, SOLUTION EPIDURAL; INFILTRATION; PERINEURAL
Status: COMPLETED
Start: 2024-09-23 | End: 2024-09-23

## 2024-09-23 RX ORDER — ROPIVACAINE HYDROCHLORIDE 2 MG/ML
8 INJECTION, SOLUTION EPIDURAL; INFILTRATION; PERINEURAL ONCE
Status: COMPLETED | OUTPATIENT
Start: 2024-09-23 | End: 2024-09-23

## 2024-09-23 RX ADMIN — MORPHINE SULFATE 10 MG: 10 INJECTION INTRAVENOUS at 10:52

## 2024-09-23 RX ADMIN — ROPIVACAINE HYDROCHLORIDE 5 ML: 2 INJECTION, SOLUTION EPIDURAL; INFILTRATION at 18:56

## 2024-09-23 RX ADMIN — PROCHLORPERAZINE EDISYLATE 10 MG: 5 INJECTION INTRAMUSCULAR; INTRAVENOUS at 10:53

## 2024-09-23 RX ADMIN — ROPIVACAINE HYDROCHLORIDE 8 ML/HR: 2 INJECTION, SOLUTION EPIDURAL; INFILTRATION at 18:58

## 2024-09-23 RX ADMIN — Medication 1 MILLI-UNITS/MIN: at 13:00

## 2024-09-23 RX ADMIN — Medication 50 MCG: at 07:09

## 2024-09-23 RX ADMIN — LIDOCAINE HYDROCHLORIDE,EPINEPHRINE BITARTRATE 3 ML: 15; .005 INJECTION, SOLUTION EPIDURAL; INFILTRATION; INTRACAUDAL; PERINEURAL at 18:50

## 2024-09-23 RX ADMIN — INSULIN LISPRO 2 UNITS: 100 INJECTION, SOLUTION INTRAVENOUS; SUBCUTANEOUS at 15:47

## 2024-09-23 RX ADMIN — Medication 25 MCG: at 02:59

## 2024-09-23 RX ADMIN — SODIUM CHLORIDE: 9 INJECTION, SOLUTION INTRAVENOUS at 17:06

## 2024-09-23 ASSESSMENT — PAIN DESCRIPTION - DESCRIPTORS: DESCRIPTORS: ACHING;CRAMPING

## 2024-09-23 ASSESSMENT — PAIN DESCRIPTION - LOCATION: LOCATION: ABDOMEN

## 2024-09-23 ASSESSMENT — PAIN SCALES - GENERAL: PAINLEVEL_OUTOF10: 7

## 2024-09-24 PROBLEM — O09.523 MULTIGRAVIDA OF ADVANCED MATERNAL AGE IN THIRD TRIMESTER: Status: ACTIVE | Noted: 2024-09-24

## 2024-09-24 PROBLEM — O43.199 MARGINAL INSERTION OF UMBILICAL CORD AFFECTING MANAGEMENT OF MOTHER: Status: ACTIVE | Noted: 2024-09-24

## 2024-09-24 PROBLEM — O24.414 INSULIN CONTROLLED GESTATIONAL DIABETES MELLITUS (GDM) IN THIRD TRIMESTER: Status: ACTIVE | Noted: 2024-09-24

## 2024-09-24 LAB
ALBUMIN SERPL-MCNC: 3 G/DL (ref 3.5–5.2)
ALBUMIN/GLOB SERPL: 1 {RATIO} (ref 1–2.5)
ALP SERPL-CCNC: 141 U/L (ref 35–104)
ALT SERPL-CCNC: 17 U/L (ref 10–35)
ANION GAP SERPL CALCULATED.3IONS-SCNC: 9 MMOL/L (ref 9–16)
AST SERPL-CCNC: 31 U/L (ref 10–35)
BASOPHILS # BLD: 0.04 K/UL (ref 0–0.2)
BASOPHILS NFR BLD: 0 % (ref 0–2)
BILIRUB SERPL-MCNC: 0.2 MG/DL (ref 0–1.2)
BUN SERPL-MCNC: 9 MG/DL (ref 6–20)
CALCIUM SERPL-MCNC: 8.2 MG/DL (ref 8.6–10.4)
CHLORIDE SERPL-SCNC: 110 MMOL/L (ref 98–107)
CO2 SERPL-SCNC: 19 MMOL/L (ref 20–31)
CREAT SERPL-MCNC: 0.6 MG/DL (ref 0.5–0.9)
EOSINOPHIL # BLD: 0.08 K/UL (ref 0–0.44)
EOSINOPHILS RELATIVE PERCENT: 0 % (ref 1–4)
ERYTHROCYTE [DISTWIDTH] IN BLOOD BY AUTOMATED COUNT: 13.9 % (ref 11.8–14.4)
GFR, ESTIMATED: >90 ML/MIN/1.73M2
GLUCOSE BLD-MCNC: 93 MG/DL (ref 65–105)
GLUCOSE SERPL-MCNC: 100 MG/DL (ref 74–99)
HCT VFR BLD AUTO: 30.3 % (ref 36.3–47.1)
HGB BLD-MCNC: 9.8 G/DL (ref 11.9–15.1)
IMM GRANULOCYTES # BLD AUTO: 0.13 K/UL (ref 0–0.3)
IMM GRANULOCYTES NFR BLD: 1 %
LYMPHOCYTES NFR BLD: 2.85 K/UL (ref 1.1–3.7)
LYMPHOCYTES RELATIVE PERCENT: 14 % (ref 24–43)
MCH RBC QN AUTO: 29.1 PG (ref 25.2–33.5)
MCHC RBC AUTO-ENTMCNC: 32.3 G/DL (ref 28.4–34.8)
MCV RBC AUTO: 89.9 FL (ref 82.6–102.9)
MONOCYTES NFR BLD: 1.02 K/UL (ref 0.1–1.2)
MONOCYTES NFR BLD: 5 % (ref 3–12)
NEUTROPHILS NFR BLD: 80 % (ref 36–65)
NEUTS SEG NFR BLD: 16 K/UL (ref 1.5–8.1)
NRBC BLD-RTO: 0 PER 100 WBC
PLATELET # BLD AUTO: 288 K/UL (ref 138–453)
PMV BLD AUTO: 10.1 FL (ref 8.1–13.5)
POTASSIUM SERPL-SCNC: 4.3 MMOL/L (ref 3.7–5.3)
PROT SERPL-MCNC: 5.8 G/DL (ref 6.6–8.7)
RBC # BLD AUTO: 3.37 M/UL (ref 3.95–5.11)
SODIUM SERPL-SCNC: 138 MMOL/L (ref 136–145)
WBC OTHER # BLD: 20.1 K/UL (ref 3.5–11.3)

## 2024-09-24 PROCEDURE — 85025 COMPLETE CBC W/AUTO DIFF WBC: CPT

## 2024-09-24 PROCEDURE — 6370000000 HC RX 637 (ALT 250 FOR IP)

## 2024-09-24 PROCEDURE — 10H07YZ INSERTION OF OTHER DEVICE INTO PRODUCTS OF CONCEPTION, VIA NATURAL OR ARTIFICIAL OPENING: ICD-10-PCS | Performed by: STUDENT IN AN ORGANIZED HEALTH CARE EDUCATION/TRAINING PROGRAM

## 2024-09-24 PROCEDURE — 36415 COLL VENOUS BLD VENIPUNCTURE: CPT

## 2024-09-24 PROCEDURE — 80053 COMPREHEN METABOLIC PANEL: CPT

## 2024-09-24 PROCEDURE — 0KQM0ZZ REPAIR PERINEUM MUSCLE, OPEN APPROACH: ICD-10-PCS | Performed by: STUDENT IN AN ORGANIZED HEALTH CARE EDUCATION/TRAINING PROGRAM

## 2024-09-24 PROCEDURE — 6360000002 HC RX W HCPCS

## 2024-09-24 PROCEDURE — 2500000003 HC RX 250 WO HCPCS

## 2024-09-24 PROCEDURE — 10907ZC DRAINAGE OF AMNIOTIC FLUID, THERAPEUTIC FROM PRODUCTS OF CONCEPTION, VIA NATURAL OR ARTIFICIAL OPENING: ICD-10-PCS | Performed by: STUDENT IN AN ORGANIZED HEALTH CARE EDUCATION/TRAINING PROGRAM

## 2024-09-24 PROCEDURE — 6360000002 HC RX W HCPCS: Performed by: STUDENT IN AN ORGANIZED HEALTH CARE EDUCATION/TRAINING PROGRAM

## 2024-09-24 PROCEDURE — 59400 OBSTETRICAL CARE: CPT | Performed by: STUDENT IN AN ORGANIZED HEALTH CARE EDUCATION/TRAINING PROGRAM

## 2024-09-24 PROCEDURE — 1220000000 HC SEMI PRIVATE OB R&B

## 2024-09-24 PROCEDURE — 7200000001 HC VAGINAL DELIVERY

## 2024-09-24 PROCEDURE — 2580000003 HC RX 258

## 2024-09-24 PROCEDURE — 82947 ASSAY GLUCOSE BLOOD QUANT: CPT

## 2024-09-24 RX ORDER — IBUPROFEN 600 MG/1
600 TABLET, FILM COATED ORAL EVERY 6 HOURS PRN
Status: DISCONTINUED | OUTPATIENT
Start: 2024-09-24 | End: 2024-09-25 | Stop reason: HOSPADM

## 2024-09-24 RX ORDER — ACETAMINOPHEN 500 MG
1000 TABLET ORAL EVERY 6 HOURS PRN
Qty: 120 TABLET | Refills: 1 | Status: SHIPPED | OUTPATIENT
Start: 2024-09-24

## 2024-09-24 RX ORDER — DOCUSATE SODIUM 100 MG/1
100 CAPSULE, LIQUID FILLED ORAL 2 TIMES DAILY
Status: DISCONTINUED | OUTPATIENT
Start: 2024-09-24 | End: 2024-09-25 | Stop reason: HOSPADM

## 2024-09-24 RX ORDER — IBUPROFEN 600 MG/1
600 TABLET, FILM COATED ORAL EVERY 6 HOURS PRN
Qty: 60 TABLET | Refills: 1 | Status: SHIPPED | OUTPATIENT
Start: 2024-09-24

## 2024-09-24 RX ORDER — METOCLOPRAMIDE HYDROCHLORIDE 5 MG/ML
10 INJECTION INTRAMUSCULAR; INTRAVENOUS ONCE
Status: COMPLETED | OUTPATIENT
Start: 2024-09-24 | End: 2024-09-24

## 2024-09-24 RX ORDER — ONDANSETRON 4 MG/1
4 TABLET, ORALLY DISINTEGRATING ORAL EVERY 6 HOURS PRN
Status: DISCONTINUED | OUTPATIENT
Start: 2024-09-24 | End: 2024-09-24 | Stop reason: SDUPTHER

## 2024-09-24 RX ORDER — LIDOCAINE HYDROCHLORIDE 10 MG/ML
INJECTION, SOLUTION INFILTRATION; PERINEURAL
Status: COMPLETED
Start: 2024-09-24 | End: 2024-09-24

## 2024-09-24 RX ORDER — MISOPROSTOL 100 UG/1
400 TABLET ORAL PRN
Status: DISCONTINUED | OUTPATIENT
Start: 2024-09-24 | End: 2024-09-25 | Stop reason: HOSPADM

## 2024-09-24 RX ORDER — DEXTROSE MONOHYDRATE 100 MG/ML
INJECTION, SOLUTION INTRAVENOUS CONTINUOUS PRN
Status: DISCONTINUED | OUTPATIENT
Start: 2024-09-24 | End: 2024-09-24

## 2024-09-24 RX ORDER — ONDANSETRON 2 MG/ML
4 INJECTION INTRAMUSCULAR; INTRAVENOUS EVERY 6 HOURS PRN
Status: DISCONTINUED | OUTPATIENT
Start: 2024-09-24 | End: 2024-09-25 | Stop reason: HOSPADM

## 2024-09-24 RX ORDER — ONDANSETRON 2 MG/ML
4 INJECTION INTRAMUSCULAR; INTRAVENOUS EVERY 6 HOURS PRN
Status: DISCONTINUED | OUTPATIENT
Start: 2024-09-24 | End: 2024-09-24 | Stop reason: SDUPTHER

## 2024-09-24 RX ORDER — SODIUM CHLORIDE 0.9 % (FLUSH) 0.9 %
5-40 SYRINGE (ML) INJECTION EVERY 12 HOURS SCHEDULED
Status: DISCONTINUED | OUTPATIENT
Start: 2024-09-24 | End: 2024-09-25 | Stop reason: HOSPADM

## 2024-09-24 RX ORDER — ONDANSETRON 4 MG/1
4 TABLET, ORALLY DISINTEGRATING ORAL EVERY 6 HOURS PRN
Status: DISCONTINUED | OUTPATIENT
Start: 2024-09-24 | End: 2024-09-25 | Stop reason: HOSPADM

## 2024-09-24 RX ORDER — ACETAMINOPHEN 500 MG
1000 TABLET ORAL EVERY 6 HOURS PRN
Status: DISCONTINUED | OUTPATIENT
Start: 2024-09-24 | End: 2024-09-25 | Stop reason: HOSPADM

## 2024-09-24 RX ORDER — SODIUM CHLORIDE 9 MG/ML
INJECTION, SOLUTION INTRAVENOUS PRN
Status: DISCONTINUED | OUTPATIENT
Start: 2024-09-24 | End: 2024-09-25 | Stop reason: HOSPADM

## 2024-09-24 RX ORDER — DIPHENHYDRAMINE HYDROCHLORIDE 50 MG/ML
25 INJECTION INTRAMUSCULAR; INTRAVENOUS ONCE
Status: COMPLETED | OUTPATIENT
Start: 2024-09-24 | End: 2024-09-24

## 2024-09-24 RX ORDER — SODIUM CHLORIDE 0.9 % (FLUSH) 0.9 %
5-40 SYRINGE (ML) INJECTION PRN
Status: DISCONTINUED | OUTPATIENT
Start: 2024-09-24 | End: 2024-09-25 | Stop reason: HOSPADM

## 2024-09-24 RX ORDER — SENNA AND DOCUSATE SODIUM 50; 8.6 MG/1; MG/1
1 TABLET, FILM COATED ORAL DAILY
Qty: 60 TABLET | Refills: 1 | Status: SHIPPED | OUTPATIENT
Start: 2024-09-24

## 2024-09-24 RX ORDER — GLUCAGON 1 MG/ML
1 KIT INJECTION PRN
Status: DISCONTINUED | OUTPATIENT
Start: 2024-09-24 | End: 2024-09-24

## 2024-09-24 RX ADMIN — LIDOCAINE HYDROCHLORIDE: 10 INJECTION, SOLUTION INFILTRATION; PERINEURAL at 12:09

## 2024-09-24 RX ADMIN — IBUPROFEN 600 MG: 600 TABLET, FILM COATED ORAL at 23:48

## 2024-09-24 RX ADMIN — METOCLOPRAMIDE 10 MG: 5 INJECTION, SOLUTION INTRAMUSCULAR; INTRAVENOUS at 03:45

## 2024-09-24 RX ADMIN — SODIUM CHLORIDE, PRESERVATIVE FREE 10 ML: 5 INJECTION INTRAVENOUS at 20:51

## 2024-09-24 RX ADMIN — DOCUSATE SODIUM 100 MG: 100 CAPSULE, LIQUID FILLED ORAL at 20:51

## 2024-09-24 RX ADMIN — ACETAMINOPHEN 1000 MG: 500 TABLET ORAL at 14:02

## 2024-09-24 RX ADMIN — DOCUSATE SODIUM 100 MG: 100 CAPSULE, LIQUID FILLED ORAL at 14:03

## 2024-09-24 RX ADMIN — DIPHENHYDRAMINE HYDROCHLORIDE 25 MG: 50 INJECTION INTRAMUSCULAR; INTRAVENOUS at 03:45

## 2024-09-24 RX ADMIN — BENZOCAINE AND LEVOMENTHOL: 200; 5 SPRAY TOPICAL at 14:03

## 2024-09-24 RX ADMIN — ROPIVACAINE HYDROCHLORIDE 8 ML/HR: 2 INJECTION, SOLUTION EPIDURAL; INFILTRATION at 03:50

## 2024-09-24 RX ADMIN — SODIUM CHLORIDE: 9 INJECTION, SOLUTION INTRAVENOUS at 12:08

## 2024-09-24 RX ADMIN — IBUPROFEN 600 MG: 600 TABLET, FILM COATED ORAL at 16:49

## 2024-09-24 RX ADMIN — ACETAMINOPHEN 1000 MG: 500 TABLET ORAL at 20:51

## 2024-09-24 ASSESSMENT — PAIN SCALES - GENERAL
PAINLEVEL_OUTOF10: 4
PAINLEVEL_OUTOF10: 2
PAINLEVEL_OUTOF10: 4
PAINLEVEL_OUTOF10: 4
PAINLEVEL_OUTOF10: 3

## 2024-09-24 ASSESSMENT — PAIN DESCRIPTION - DESCRIPTORS
DESCRIPTORS: SORE
DESCRIPTORS: CRAMPING;DISCOMFORT
DESCRIPTORS: DULL;ACHING;DISCOMFORT
DESCRIPTORS: SORE

## 2024-09-24 ASSESSMENT — PAIN DESCRIPTION - LOCATION
LOCATION: PERINEUM
LOCATION: PERINEUM
LOCATION: PERINEUM;BACK
LOCATION: BACK;PERINEUM;ABDOMEN
LOCATION: ABDOMEN;BACK

## 2024-09-25 VITALS
RESPIRATION RATE: 18 BRPM | OXYGEN SATURATION: 98 % | SYSTOLIC BLOOD PRESSURE: 118 MMHG | HEART RATE: 94 BPM | TEMPERATURE: 98.7 F | DIASTOLIC BLOOD PRESSURE: 75 MMHG

## 2024-09-25 LAB
CREAT UR-MCNC: 84.1 MG/DL (ref 28–217)
TOTAL PROTEIN, URINE: 21 MG/DL
URINE TOTAL PROTEIN CREATININE RATIO: 0.25

## 2024-09-25 PROCEDURE — 84156 ASSAY OF PROTEIN URINE: CPT

## 2024-09-25 PROCEDURE — 99024 POSTOP FOLLOW-UP VISIT: CPT | Performed by: STUDENT IN AN ORGANIZED HEALTH CARE EDUCATION/TRAINING PROGRAM

## 2024-09-25 PROCEDURE — 82570 ASSAY OF URINE CREATININE: CPT

## 2024-09-25 PROCEDURE — 6370000000 HC RX 637 (ALT 250 FOR IP)

## 2024-09-25 RX ADMIN — ACETAMINOPHEN 1000 MG: 500 TABLET ORAL at 09:45

## 2024-09-25 RX ADMIN — IBUPROFEN 600 MG: 600 TABLET, FILM COATED ORAL at 16:43

## 2024-09-25 RX ADMIN — IBUPROFEN 600 MG: 600 TABLET, FILM COATED ORAL at 09:45

## 2024-09-25 RX ADMIN — DOCUSATE SODIUM 100 MG: 100 CAPSULE, LIQUID FILLED ORAL at 09:44

## 2024-09-25 RX ADMIN — ACETAMINOPHEN 1000 MG: 500 TABLET ORAL at 16:43

## 2024-09-25 ASSESSMENT — PAIN DESCRIPTION - DESCRIPTORS
DESCRIPTORS: SORE
DESCRIPTORS: SORE

## 2024-09-25 ASSESSMENT — PAIN DESCRIPTION - LOCATION
LOCATION: PERINEUM
LOCATION: PERINEUM

## 2024-09-25 ASSESSMENT — PAIN SCALES - GENERAL
PAINLEVEL_OUTOF10: 2
PAINLEVEL_OUTOF10: 3

## 2024-09-27 ENCOUNTER — PATIENT MESSAGE (OUTPATIENT)
Dept: OBGYN CLINIC | Age: 34
End: 2024-09-27

## 2024-10-10 ENCOUNTER — POSTPARTUM VISIT (OUTPATIENT)
Dept: OBGYN CLINIC | Age: 34
End: 2024-10-10

## 2024-10-10 VITALS
HEIGHT: 66 IN | WEIGHT: 235 LBS | SYSTOLIC BLOOD PRESSURE: 112 MMHG | BODY MASS INDEX: 37.77 KG/M2 | DIASTOLIC BLOOD PRESSURE: 62 MMHG

## 2024-10-10 PROBLEM — Z3A.39 39 WEEKS GESTATION OF PREGNANCY: Status: RESOLVED | Noted: 2024-09-22 | Resolved: 2024-10-10

## 2024-10-10 PROBLEM — O43.199 MARGINAL INSERTION OF UMBILICAL CORD AFFECTING MANAGEMENT OF MOTHER: Status: RESOLVED | Noted: 2024-09-24 | Resolved: 2024-10-10

## 2024-10-10 PROBLEM — O24.414 INSULIN CONTROLLED GESTATIONAL DIABETES MELLITUS (GDM) IN THIRD TRIMESTER: Status: RESOLVED | Noted: 2024-09-24 | Resolved: 2024-10-10

## 2024-10-10 PROCEDURE — 99024 POSTOP FOLLOW-UP VISIT: CPT | Performed by: STUDENT IN AN ORGANIZED HEALTH CARE EDUCATION/TRAINING PROGRAM

## 2024-10-10 ASSESSMENT — ANXIETY QUESTIONNAIRES
IF YOU CHECKED OFF ANY PROBLEMS ON THIS QUESTIONNAIRE, HOW DIFFICULT HAVE THESE PROBLEMS MADE IT FOR YOU TO DO YOUR WORK, TAKE CARE OF THINGS AT HOME, OR GET ALONG WITH OTHER PEOPLE: NOT DIFFICULT AT ALL
7. FEELING AFRAID AS IF SOMETHING AWFUL MIGHT HAPPEN: NOT AT ALL
3. WORRYING TOO MUCH ABOUT DIFFERENT THINGS: NOT AT ALL
5. BEING SO RESTLESS THAT IT IS HARD TO SIT STILL: NOT AT ALL
1. FEELING NERVOUS, ANXIOUS, OR ON EDGE: NOT AT ALL
2. NOT BEING ABLE TO STOP OR CONTROL WORRYING: NOT AT ALL
6. BECOMING EASILY ANNOYED OR IRRITABLE: NOT AT ALL
GAD7 TOTAL SCORE: 0
4. TROUBLE RELAXING: NOT AT ALL

## 2024-10-10 NOTE — PROGRESS NOTES
Postpartum Visit    Lauren Franke is a 34 y.o. female , Post Partum    The patient was seen. She has no chief complaint today.    She is  breast feeding and denies signs or symptoms of mastitis.  The patient completed the E.P.D.S. Post Partum Depression Evaluation form and EPDS Score of 1. She does not have signs or symptoms of post partum depression. She denies any suicidal thoughts with a plan, intent to harm others, and delusional ideas. She does admit to having good home support. Today her lochia is light she denies any dizziness or shortness of breath.  Her bowels are regular and she denies any urinary tract symptomology. She is using abstinence for contraception.     Her pregnancy was complicated by:  Patient Active Problem List    Diagnosis Date Noted     24 F Apg  Wt 5#14 2024    Multigravida of advanced maternal age in third trimester 2024     (spontaneous vaginal delivery) 2024    GDMA2 2024     Overview Note:     Novolog 0/0/0   NPH 5 U nightly     24      Migraine without aura and without status migrainosus, not intractable 10/31/2018    Eczema 2016       Vitals:   Blood pressure 112/62, height 1.676 m (5' 6\"), weight 106.6 kg (235 lb), last menstrual period 10/10/2023, currently breastfeeding.    Physical Exam:  Chaperone for Exam: Chaperone was offered and patient declined    General:  no apparent distress, alert, and cooperative  Lungs:  No increased work of breathing, no conversational dyspnea  Heart: Regular rate and rhythm  Abdomen: Abdomen soft, non-tender, no rebound, guarding, rigidity  Fundus: non-tender, normal size, firm, below umbilicus  Perineum: declined exam  Extremities:  no calf tenderness, non edematous, non erythematous     Assessment:  Lauren Franke is a 34 y.o. female , 2 weeks Post Partum s/p spontaneous vaginal delivery on 24   - Doing well, continue routine post partum care   - COVID-19 vaccination: R/B/A

## 2024-11-05 ENCOUNTER — TELEPHONE (OUTPATIENT)
Dept: OBGYN CLINIC | Age: 34
End: 2024-11-05

## 2024-11-05 NOTE — TELEPHONE ENCOUNTER
Patient called and left VM stating wants to cancel appointment for 11/4/24 @ 2:15pm    Pt stated in VM does not want to r/s stating no issues and is feeling great

## 2024-11-13 ENCOUNTER — HOSPITAL ENCOUNTER (OUTPATIENT)
Age: 34
Setting detail: SPECIMEN
Discharge: HOME | End: 2024-11-13

## 2024-11-13 ENCOUNTER — OFFICE VISIT (OUTPATIENT)
Dept: OBGYN CLINIC | Age: 34
End: 2024-11-13
Payer: COMMERCIAL

## 2024-11-13 VITALS
BODY MASS INDEX: 36.96 KG/M2 | DIASTOLIC BLOOD PRESSURE: 68 MMHG | HEIGHT: 66 IN | SYSTOLIC BLOOD PRESSURE: 112 MMHG | WEIGHT: 230 LBS

## 2024-11-13 DIAGNOSIS — R10.2 VAGINAL PAIN: Primary | ICD-10-CM

## 2024-11-13 DIAGNOSIS — N94.9 VAGINAL DISCOMFORT: ICD-10-CM

## 2024-11-13 DIAGNOSIS — R10.2 VAGINAL PAIN: ICD-10-CM

## 2024-11-13 LAB
CANDIDA SPECIES: NEGATIVE
GARDNERELLA VAGINALIS: NEGATIVE
SOURCE: NORMAL
TRICHOMONAS: NEGATIVE

## 2024-11-13 PROCEDURE — 99214 OFFICE O/P EST MOD 30 MIN: CPT | Performed by: ADVANCED PRACTICE MIDWIFE

## 2024-11-13 ASSESSMENT — ENCOUNTER SYMPTOMS
NAUSEA: 0
SHORTNESS OF BREATH: 0
DIARRHEA: 0
ABDOMINAL PAIN: 0
VOMITING: 0

## 2024-11-13 NOTE — PROGRESS NOTES
Chaperone for Intimate Exam  Chaperone was offered as part of the rooming process. Patient declined and agrees to continue with exam without a chaperone.  Chaperone: n/a      
Memory: Cognition and memory normal.         Judgment: Judgment normal.   Vitals reviewed.           Assessment & Plan  1. Vaginal pain  - Discussed scar tissue and healing  - Discussed pelvic floor therapy, has a therapist and is going to call for an appointment  - reviewed body mechanics  - reviewed the role of breastfeeding and decreasing estrogen which can affect vaginal tissue  - discussed external lubrication with sex   - Vaginitis DNA Probe; Future    2. Vaginal discomfort  - Vaginitis DNA Probe; Future        The patient, Lauren Franke , was seen with a total time spent of 35 minutes for the visit on this date of service [facility]  Both face-to-face (counseling and education) and non face-to-face time (care coordination), were spent in determining the total time component.     Return if symptoms worsen or fail to improve.    Electronically Signed bySOL Miles CNM

## 2024-11-14 ENCOUNTER — PATIENT MESSAGE (OUTPATIENT)
Dept: OBGYN CLINIC | Age: 34
End: 2024-11-14

## 2024-11-14 DIAGNOSIS — M62.89 PELVIC FLOOR DYSFUNCTION: Primary | ICD-10-CM

## 2024-12-04 ENCOUNTER — HOSPITAL ENCOUNTER (OUTPATIENT)
Dept: PHYSICAL THERAPY | Facility: CLINIC | Age: 34
Setting detail: THERAPIES SERIES
Discharge: HOME | End: 2024-12-04

## 2024-12-04 NOTE — FLOWSHEET NOTE
[] [facility]  Outpatient Rehabilitation &  Therapy  [address]  [phone]  [phone] [x] [facility]  Outpatient Rehabilitation &  Therapy  [address]  P: [phone]  F: [phone] [] [facility]  Outpatient Rehabilitation &  Therapy  [address]  P: [phone]  F: [phone] [] [facility]  Outpatient Rehabilitation &  Therapy  [address]  [phone]  [phone] [] [facility]  Outpatient Rehabilitation &  Therapy  [address]   P: [phone]  F: [phone]  [] [facility]  Outpatient Rehabilitation &  Therapy  [address]  P: [phone]  F: [phone] [] [facility]  Outpatient Rehabilitation &  Therapy  [address]  P: [phone]  F: [phone] [] [facility]  Outpatient Rehabilitation &  Therapy  [address]  P: [phone]  F: [phone] [] [facility]  Outpatient Rehabilitation &  Therapy  [address]  P: [phone]  F: [phone]  [] [facility] Outpatient Rehabilitation &  Therapy  [address]  P: [phone]  F: [phone]     Therapy Cancel/No Show note    Date: 2024  Patient: Lauren Franke  : 1990  MRN: [id number]    Cancels/No Shows to date: 0    For today's appointment patient:    [x]  Cancelled    [] Rescheduled appointment    [] No-show     Reason given by patient:    [x]  Patient ill    []  Conflicting appointment    [] No transportation      [] Conflict with work    [] No reason given    [] Weather related    [] COVID-19    [x] Other:      Comments:  Pt will be in touch to reschedule.       [] Next appointment was confirmed    Electronically signed by: Josefina Stallworth PT

## 2024-12-16 ENCOUNTER — HOSPITAL ENCOUNTER (OUTPATIENT)
Dept: PHYSICAL THERAPY | Facility: CLINIC | Age: 34
Setting detail: THERAPIES SERIES
Discharge: HOME | End: 2024-12-16
Payer: COMMERCIAL

## 2024-12-16 PROCEDURE — 97161 PT EVAL LOW COMPLEX 20 MIN: CPT

## 2024-12-16 PROCEDURE — 97110 THERAPEUTIC EXERCISES: CPT

## 2024-12-16 PROCEDURE — 97530 THERAPEUTIC ACTIVITIES: CPT

## 2024-12-16 NOTE — CONSULTS
High Complexity      Treatment Charges: Mins Units   [x] Evaluation       [x]  Low        []  Moderate       []  High 35 1   []  Modalities       [x]  Ther Exercise 6 0   []  Neuromuscular Re-ed     []  Gait Training     []  Manual Therapy     [x]  Ther Activities 17 2   []  Aquatics     []  Vasocompression     []  Cervical Traction     []  Other     Total Billable time 58 min 3     Time in: 4:08p Time out: 5:06p      Electronically signed by: Josefina Stallworth PT, DPT     Physician Signature:________________________________Date:__________________  By signing above or cosigning this note, I have reviewed this plan of care and certify a need for medically necessary rehabilitation services.       *PLEASE SIGN ABOVE AND FAX BACK ALL PAGES*

## 2024-12-31 ENCOUNTER — HOSPITAL ENCOUNTER (OUTPATIENT)
Dept: PHYSICAL THERAPY | Facility: CLINIC | Age: 34
Setting detail: THERAPIES SERIES
Discharge: HOME | End: 2024-12-31
Payer: COMMERCIAL

## 2024-12-31 PROCEDURE — 97530 THERAPEUTIC ACTIVITIES: CPT

## 2024-12-31 PROCEDURE — 97140 MANUAL THERAPY 1/> REGIONS: CPT

## 2024-12-31 PROCEDURE — 97110 THERAPEUTIC EXERCISES: CPT

## 2024-12-31 NOTE — FLOWSHEET NOTE
[x] [facility]  Outpatient Rehabilitation &  Therapy  [address]  P: [phone]  F: [phone]     Physical Therapy Daily Treatment Note    Date:  2024  Patient Name:  Lauren Franke    :  1990  MRN: [id number]  Physician: Hillary Quinn                           Insurance: [employer] Choice POS: 52vs remain/60vs allowed calyr, HardMax, PTOTST combined; only 4 modalities per visit    Medical Diagnosis: M62.89 (ICD-10-CM) - Pelvic floor dysfunction   Rehab Codes: R27.8, R29.3, N39.3, M62.81, N94.1, R10.2, M99.05  Onset Date: 24     Next 's appt.: unknown  Visit# / total visits:     Cancels/No Shows: 0    Subjective:    Pain:  [] Yes  [x] No Location:  N/A  Pain Rating: (0-10 scale) 0/10  Pain altered Tx:  [x] No  [] Yes  Action:    Comments: Pt denies complaints at present. Has not yet implemented wand for internal releases and has not resumed intercourse recently.     Objective:  Modalities:   Precautions:   Exercises: [employer] Access Code: [email or web address]  KT= Kinesiotape  PB= pelvic brace (DB-exhale+ TA contraction + kegel)  DB= diaphragmatic breathing  Exercise Reps/ Time Weight/ Level Comments   Pelvic model explanation     PF function - 3 layers  Diaphragm & pelvic floor relationship/synergy     Koffi oil for lube & vulvovaginal moisture  koffi oil for lube!  oh-nut for penetration  scar tissue massage 3-5 x per week   squatty potty/ stool for constipation  keep ribs over pelvis - decrease butt clenching     exhale with effort  rest breaths between reps   pelvic brace (exhale, core contraction & pelvic floor muscle lift)                            Supine          Diaphragmatic breathing  HEP   Seated HEP alternative   Transversus Abdominis Bracing with Pelvic Floor Contraction + hip add 10x3\" ball 2-3 x daily; may need 2 step tech   Shoulder flexion with PB 10x 8#    Shoulder ext with PB 10x Blue     Hip abd with pelvic brace 10x Blue     Bent knee

## 2025-01-02 ENCOUNTER — APPOINTMENT (OUTPATIENT)
Dept: PHYSICAL THERAPY | Facility: CLINIC | Age: 35
End: 2025-01-02
Payer: COMMERCIAL

## 2025-01-07 ENCOUNTER — HOSPITAL ENCOUNTER (OUTPATIENT)
Dept: PHYSICAL THERAPY | Facility: CLINIC | Age: 35
Setting detail: THERAPIES SERIES
Discharge: HOME | End: 2025-01-07
Payer: COMMERCIAL

## 2025-01-07 PROCEDURE — 97140 MANUAL THERAPY 1/> REGIONS: CPT

## 2025-01-07 PROCEDURE — 97110 THERAPEUTIC EXERCISES: CPT

## 2025-01-07 NOTE — FLOWSHEET NOTE
[x] [facility]  Outpatient Rehabilitation &  Therapy  [address]  P: [phone]  F: [phone]     Physical Therapy Daily Treatment Note    Date:  2025  Patient Name:  Lauren Franke    :  1990  MRN: [id number]  Physician: Hillary Quinn                           Insurance: [employer] Choice POS: 52vs remain/60vs allowed calyr, HardMax, PTOTST combined; only 4 modalities per visit    Medical Diagnosis: M62.89 (ICD-10-CM) - Pelvic floor dysfunction   Rehab Codes: R27.8, R29.3, N39.3, M62.81, N94.1, R10.2, M99.05  Onset Date: 24     Next 's appt.: unknown  Visit# / total visits: 3/14    Cancels/No Shows: 0    Subjective:    Pain:  [] Yes  [x] No  Location:  N/A  Pain Rating: (0-10 scale) 0/10  Pain altered Tx:  [x] No  [] Yes  Action:    Comments: Pt did have intercourse since last session and notes feeling discomfort which improved as intimacy progressed. Pt endorses use of coconut oil. Pt also reports feeling improved with her pelvic brace/the knack.    Objective:  Modalities:   Precautions:   Exercises: [employer] Access Code: [email or web address]  KT= Kinesiotape  PB= pelvic brace (DB-exhale+ TA contraction + kegel)  DB= diaphragmatic breathing  Exercise Reps/ Time Weight/ Level Comments   Pelvic model explanation     PF function - 3 layers  Diaphragm & pelvic floor relationship/synergy     Koffi oil for lube & vulvovaginal moisture  koffi oil for lube!  oh-nut for penetration  scar tissue massage 3-5 x per week   squatty potty/ stool for constipation  keep ribs over pelvis - decrease butt clenching     exhale with effort  rest breaths between reps   pelvic brace (exhale, core contraction & pelvic floor muscle lift)                            Supine          Diaphragmatic breathing  HEP   Seated HEP alternative   Transversus Abdominis Bracing with Pelvic Floor Contraction + hip add 10x4\" ball 2-3 x daily; may need 2 step tech  Progressed time 25   Shoulder

## 2025-01-14 ENCOUNTER — HOSPITAL ENCOUNTER (OUTPATIENT)
Dept: PHYSICAL THERAPY | Facility: CLINIC | Age: 35
Setting detail: THERAPIES SERIES
Discharge: HOME | End: 2025-01-14
Payer: COMMERCIAL

## 2025-01-14 NOTE — FLOWSHEET NOTE
[] [facility]  Outpatient Rehabilitation &  Therapy  [address]  [phone]  [phone] [x] [facility]  Outpatient Rehabilitation &  Therapy  [address]  P: [phone]  F: [phone] [] [facility]  Outpatient Rehabilitation &  Therapy  [address]  P: [phone]  F: [phone] [] [facility]  Outpatient Rehabilitation &  Therapy  [address]  [phone]  [phone] [] [facility]  Outpatient Rehabilitation &  Therapy  [address]   P: [phone]  F: [phone]  [] [facility]  Outpatient Rehabilitation &  Therapy  [address]  P: [phone]  F: [phone] [] [facility]  Outpatient Rehabilitation &  Therapy  [address]  P: [phone]  F: [phone] [] [facility]  Outpatient Rehabilitation &  Therapy  [address]  P: [phone]  F: [phone] [] [facility]  Outpatient Rehabilitation &  Therapy  [address]  P: [phone]  F: [phone]  [] [facility] Outpatient Rehabilitation &  Therapy  [address]  P: [phone]  F: [phone]     Therapy Cancel/No Show note    Date: 2025  Patient: Lauren Franke  : 1990  MRN: [id number]    Cancels/No Shows to date:     For today's appointment patient:    [x]  Cancelled    [] Rescheduled appointment    [] No-show     Reason given by patient:    []  Patient ill    []  Conflicting appointment    [] No transportation      [] Conflict with work    [x] No reason given    [] Weather related    [] COVID-19    [] Other:      Comments:        [x] Next appointment was confirmed    Electronically signed by: CLEMENCIA QUINN, PTA

## 2025-01-21 ENCOUNTER — HOSPITAL ENCOUNTER (OUTPATIENT)
Dept: PHYSICAL THERAPY | Facility: CLINIC | Age: 35
Setting detail: THERAPIES SERIES
Discharge: HOME | End: 2025-01-21
Payer: COMMERCIAL

## 2025-01-21 PROCEDURE — 97112 NEUROMUSCULAR REEDUCATION: CPT

## 2025-01-21 PROCEDURE — 97110 THERAPEUTIC EXERCISES: CPT

## 2025-01-21 NOTE — FLOWSHEET NOTE
significant compensations to reduce load transfer and improve core, SL stability & endurance  Independent with Home Exercise Programs, adherence to relaxation strategies for optimal PF excursion & self-manual to perineum/abdominal fascia for improved abdominal / RAMESH / pelvic health function  Pt to report no pain with initial penetration to promote positive sexual experience, when she resumes intercourse  Pt will demonstrate good toileting posture with use of squatty potty &/or stool to reduce straining & rectal bleeding      LTG: (to be met in 14 treatments)  Pt will reports lessened pain to <1-2/10 to ease ADLs  ? Strength: Pt to achieve PF strength of 4/5 &/or 8 seconds endurance without fatigue to indicate improved PF function to reduce urinary incontinence   Pt will demo abdominal strength to \"fair\" or greater for improved core stability during activity  Pt will report 1-2 points on KAMILA & 3 points or less on PPIQ to demonstrate improved urogenital / bladder & pelvic health function  Pt to demo improved IAP regulation and reduction of RAMESH by 1 finger widths & 0.5 knuckle depth at for improved core stability & proper tension generation through linea alba to assist with heavier lifting of objects  Patient will demo use of functional PFM contraction by performing a “pelvic brace” or “knack” in order to reduce/eliminate CARMEN during higher level ADLs  Pt to report no pain with deep penetration with thrusting to promote positive sexual experience, when she resumes intercourse  Pt will report no need for incontinent product use due to reduced UI by 90%     Patient goals: \"pain-free intercourse & able to move without discomfort\"    Pt. Education:  [x] Yes  [] No  [x] Reviewed Prior HEP/Ed  Method of Education: [x] Verbal  [x] Demo- wand use, perineal/scar massage  [x] Written- updated bolded below   Access Code: [email or web address]  URL: [email or web address]  Date: 01/21/2025  Prepared by: Catalina Muñoz

## 2025-01-27 ENCOUNTER — HOSPITAL ENCOUNTER (OUTPATIENT)
Dept: PHYSICAL THERAPY | Facility: CLINIC | Age: 35
Setting detail: THERAPIES SERIES
Discharge: HOME | End: 2025-01-27
Payer: COMMERCIAL

## 2025-01-27 PROCEDURE — 97112 NEUROMUSCULAR REEDUCATION: CPT

## 2025-01-27 PROCEDURE — 97110 THERAPEUTIC EXERCISES: CPT

## 2025-01-27 NOTE — FLOWSHEET NOTE
[x] [facility]  Outpatient Rehabilitation &  Therapy  [address]  P: [phone]  F: [phone]     Physical Therapy Daily Treatment Note    Date:  2025  Patient Name:  Lauren Franke    :  1990  MRN: [id number]  Physician: Hillary Quinn                           Insurance: [employer] Choice POS: 52vs remain/60vs allowed calyr, HardMax, PTOTST combined; only 4 modalities per visit    Medical Diagnosis: M62.89 (ICD-10-CM) - Pelvic floor dysfunction   Rehab Codes: R27.8, R29.3, N39.3, M62.81, N94.1, R10.2, M99.05  Onset Date: 24     Next 's appt.: unknown  Visit# / total visits:     Cancels/No Shows: 0    Subjective:    Pain:  [] Yes  [x] No  Location:  N/A  Pain Rating: (0-10 scale) 0/10  Pain altered Tx:  [x] No  [] Yes  Action:  Comments: Pt enters without pain complaints. Does express that she has had occasional light spotting since resuming work & intercourse. She is not overly concerned due to it not being a significant amount & being pink color in nature.    Objective:  Consent: Patient verbally consented to Internal surface electrode for sEMG with no red flags present 2025. Patient was appropriately draped and only areas that were being treated were exposed. Therapist provided detailed explanation of treatment prior to initiation of session. Patient verbalized and demonstrated understanding and provided verbal consent. Consent was checked and received prior to initiating different treatment techniques and checked frequently throughout session.   Modalities:   Precautions:   Exercises: [employer] Access Code: [email or web address]  KT= Kinesiotape  PB= pelvic brace (DB-exhale+ TA contraction + kegel)  DB= diaphragmatic breathing  Exercise Reps/ Time Weight/ Level Comments   Pelvic model explanation     PF function - 3 layers  Diaphragm & pelvic floor relationship/synergy     Koffi oil for lube & vulvovaginal moisture  koffi oil for lube!  oh-nut for

## 2025-02-10 ENCOUNTER — HOSPITAL ENCOUNTER (OUTPATIENT)
Dept: PHYSICAL THERAPY | Facility: CLINIC | Age: 35
Setting detail: THERAPIES SERIES
Discharge: HOME | End: 2025-02-10

## 2025-02-10 NOTE — FLOWSHEET NOTE
[] [facility]  Outpatient Rehabilitation &  Therapy  [address]  [phone]  [phone] [x] [facility]  Outpatient Rehabilitation &  Therapy  [address]  P: [phone]  F: [phone] [] [facility]  Outpatient Rehabilitation &  Therapy  [address]  P: [phone]  F: [phone] [] [facility]  Outpatient Rehabilitation &  Therapy  [address]  [phone]  [phone] [] [facility]  Outpatient Rehabilitation &  Therapy  [address]   P: [phone]  F: [phone]  [] [facility]  Outpatient Rehabilitation &  Therapy  [address]  P: [phone]  F: [phone] [] [facility]  Outpatient Rehabilitation &  Therapy  [address]  P: [phone]  F: [phone] [] [facility]  Outpatient Rehabilitation &  Therapy  [address]  P: [phone]  F: [phone] [] [facility]  Outpatient Rehabilitation &  Therapy  [address]  P: [phone]  F: [phone]  [] [facility] Outpatient Rehabilitation &  Therapy  [address]  P: [phone]  F: [phone]     Therapy Cancel/No Show note    Date: 2/10/2025  Patient: Lauren Franke  : 1990  MRN: [id number]    Cancels/No Shows to date: 0    For today's appointment patient:    [x]  Cancelled    [] Rescheduled appointment    [] No-show     Reason given by patient:    []  Patient ill    []  Conflicting appointment    [] No transportation      [] Conflict with work    [] No reason given    [] Weather related    [] COVID-19    [x] Other:   no    Comments:        [x] Next appointment was confirmed    Electronically signed by: CLEMENCIA QUINN, PTA

## 2025-04-24 ENCOUNTER — TELEPHONE (OUTPATIENT)
Dept: OBGYN CLINIC | Age: 35
End: 2025-04-24

## 2025-04-24 NOTE — TELEPHONE ENCOUNTER
Patient called concerning getting billed on redraw from [facility] on 3/16/24.Patient is sending copy of bill richard Browne through [employer].

## 2025-04-28 NOTE — TELEPHONE ENCOUNTER
Will forward bill to [employer] and ask that they call her regarding the billing as we do not have anything to do with the billing for them. Hollie Al LPN